# Patient Record
Sex: MALE | Race: WHITE | Employment: OTHER
[De-identification: names, ages, dates, MRNs, and addresses within clinical notes are randomized per-mention and may not be internally consistent; named-entity substitution may affect disease eponyms.]

---

## 2017-03-17 ENCOUNTER — SURGERY (OUTPATIENT)
Age: 77
End: 2017-03-17

## 2017-03-17 ENCOUNTER — ANESTHESIA EVENT (OUTPATIENT)
Dept: ENDOSCOPY | Age: 77
End: 2017-03-17
Payer: MEDICARE

## 2017-03-17 ENCOUNTER — HOSPITAL ENCOUNTER (OUTPATIENT)
Age: 77
Setting detail: OUTPATIENT SURGERY
Discharge: HOME | End: 2017-03-17
Attending: SPECIALIST | Admitting: SPECIALIST
Payer: MEDICARE

## 2017-03-17 ENCOUNTER — ANESTHESIA (OUTPATIENT)
Dept: ENDOSCOPY | Age: 77
End: 2017-03-17
Payer: MEDICARE

## 2017-03-17 VITALS
DIASTOLIC BLOOD PRESSURE: 60 MMHG | BODY MASS INDEX: 27.09 KG/M2 | HEIGHT: 72 IN | OXYGEN SATURATION: 100 % | WEIGHT: 200 LBS | SYSTOLIC BLOOD PRESSURE: 128 MMHG | TEMPERATURE: 97.5 F | RESPIRATION RATE: 16 BRPM | HEART RATE: 73 BPM

## 2017-03-17 LAB
GLUCOSE BLD STRIP.AUTO-MCNC: 143 MG/DL (ref 65–100)
SERVICE CMNT-IMP: ABNORMAL

## 2017-03-17 PROCEDURE — 76040000019: Performed by: SPECIALIST

## 2017-03-17 PROCEDURE — 74011250636 HC RX REV CODE- 250/636: Performed by: PHYSICIAN ASSISTANT

## 2017-03-17 PROCEDURE — 82962 GLUCOSE BLOOD TEST: CPT

## 2017-03-17 PROCEDURE — 76060000031 HC ANESTHESIA FIRST 0.5 HR: Performed by: SPECIALIST

## 2017-03-17 PROCEDURE — 74011250637 HC RX REV CODE- 250/637: Performed by: PHYSICIAN ASSISTANT

## 2017-03-17 PROCEDURE — 74011250636 HC RX REV CODE- 250/636

## 2017-03-17 RX ORDER — PROPOFOL 10 MG/ML
INJECTION, EMULSION INTRAVENOUS AS NEEDED
Status: DISCONTINUED | OUTPATIENT
Start: 2017-03-17 | End: 2017-03-17 | Stop reason: HOSPADM

## 2017-03-17 RX ORDER — PROPOFOL 10 MG/ML
INJECTION, EMULSION INTRAVENOUS
Status: DISCONTINUED | OUTPATIENT
Start: 2017-03-17 | End: 2017-03-17 | Stop reason: HOSPADM

## 2017-03-17 RX ORDER — MIDAZOLAM HYDROCHLORIDE 1 MG/ML
.25-5 INJECTION, SOLUTION INTRAMUSCULAR; INTRAVENOUS
Status: DISCONTINUED | OUTPATIENT
Start: 2017-03-17 | End: 2017-03-17 | Stop reason: HOSPADM

## 2017-03-17 RX ORDER — NALOXONE HYDROCHLORIDE 0.4 MG/ML
0.4 INJECTION, SOLUTION INTRAMUSCULAR; INTRAVENOUS; SUBCUTANEOUS
Status: DISCONTINUED | OUTPATIENT
Start: 2017-03-17 | End: 2017-03-17 | Stop reason: HOSPADM

## 2017-03-17 RX ORDER — ATROPINE SULFATE 0.1 MG/ML
0.5 INJECTION INTRAVENOUS
Status: DISCONTINUED | OUTPATIENT
Start: 2017-03-17 | End: 2017-03-17 | Stop reason: HOSPADM

## 2017-03-17 RX ORDER — EPINEPHRINE 0.1 MG/ML
1 INJECTION INTRACARDIAC; INTRAVENOUS
Status: DISCONTINUED | OUTPATIENT
Start: 2017-03-17 | End: 2017-03-17 | Stop reason: HOSPADM

## 2017-03-17 RX ORDER — SODIUM CHLORIDE 9 MG/ML
50 INJECTION, SOLUTION INTRAVENOUS CONTINUOUS
Status: DISCONTINUED | OUTPATIENT
Start: 2017-03-17 | End: 2017-03-17 | Stop reason: HOSPADM

## 2017-03-17 RX ORDER — FENTANYL CITRATE 50 UG/ML
100 INJECTION, SOLUTION INTRAMUSCULAR; INTRAVENOUS ONCE
Status: DISCONTINUED | OUTPATIENT
Start: 2017-03-17 | End: 2017-03-17 | Stop reason: HOSPADM

## 2017-03-17 RX ORDER — DEXTROMETHORPHAN/PSEUDOEPHED 2.5-7.5/.8
1.2 DROPS ORAL
Status: DISCONTINUED | OUTPATIENT
Start: 2017-03-17 | End: 2017-03-17 | Stop reason: HOSPADM

## 2017-03-17 RX ORDER — FLUMAZENIL 0.1 MG/ML
0.2 INJECTION INTRAVENOUS
Status: DISCONTINUED | OUTPATIENT
Start: 2017-03-17 | End: 2017-03-17 | Stop reason: HOSPADM

## 2017-03-17 RX ADMIN — PROPOFOL 60 MG: 10 INJECTION, EMULSION INTRAVENOUS at 09:48

## 2017-03-17 RX ADMIN — SODIUM CHLORIDE 50 ML/HR: 900 INJECTION, SOLUTION INTRAVENOUS at 09:22

## 2017-03-17 RX ADMIN — SIMETHICONE 80 MG: 20 SUSPENSION/ DROPS ORAL at 10:02

## 2017-03-17 RX ADMIN — PROPOFOL 120 MCG/KG/MIN: 10 INJECTION, EMULSION INTRAVENOUS at 09:48

## 2017-03-17 NOTE — INTERVAL H&P NOTE
Pre-Endoscopy H&P Update  Chief complaint/HPI/ROS:  The indication for the procedure, the patient's history and the patient's current medications are reviewed prior to the procedure and that data is reported on the H&P to which this document is attached. Any significant complaints with regard to organ systems will be noted, and if not mentioned then a review of systems is not contributory. Past Medical History:   Diagnosis Date    Diabetes ([address]     Hypertension     Other ill-defined conditions(799.89)     MRSA on left shoulder from insect bite      Past Surgical History:   Procedure Laterality Date    HX CATARACT REMOVAL Bilateral     HX COLONOSCOPY      HX OTHER SURGICAL  1945    left eye      Social   Social History   Substance Use Topics    Smoking status: Never Smoker    Smokeless tobacco: Never Used    Alcohol use No      Family History   Problem Relation Age of Onset    Emphysema Mother     Cancer Father     Cancer Sister       No Known Allergies   Prior to Admission Medications   Prescriptions Last Dose Informant Patient Reported? Taking? ATORVASTATIN CALCIUM (LIPITOR PO) 3/16/2017 at am  Yes Yes   Sig: Take 1 Tab by mouth daily. OTHER 3/16/2017 at am  Yes Yes   Sig: Take 1 Tab by mouth daily. Indications: vit-b complex   amLODIPine-benazepril (LOTREL) 5-10 mg per capsule 3/16/2017 at am  Yes Yes   Sig: Take 1 Cap by mouth daily. indapamide (LOZOL) 1.25 mg tablet 3/16/2017 at am  Yes Yes   Sig: Take 1.25 mg by mouth daily. sitaGLIPtin-metFORMIN (JANUMET)  mg per tablet 3/16/2017 at am  Yes Yes   Sig: Take 1 Tab by mouth two (2) times daily (with meals). Facility-Administered Medications: None       PHYSICAL EXAM:  The patient is examined immediately prior to the procedure.   Visit Vitals    /66    Pulse 85    Temp 98.5 °F (36.9 °C)    Resp 14    Ht 6' (1.829 m)    Wt 90.7 kg (200 lb)    SpO2 99%    BMI 27.12 kg/m2     Gen: Appears comfortable, no distress. Pulm: comfortable respirations with no abnormal audible breath sounds  CV: heart regular, well perfused  GI: abdomen flat. PLAN:  Informed consent discussion held, patient afforded an opportunity to ask questions and all questions answered. After being advised of the risks, benefits, and alternatives, the patient requested that we proceed and indicated so on a written consent form. Will proceed with procedure as planned.   Nigel Pavon MD

## 2017-03-17 NOTE — PROGRESS NOTES
Isis   1940  [id number]    Situation:  Verbal report received from: Kurt Bertrand, RN  Procedure: Procedure(s):  COLONOSCOPY    Background:    Preoperative diagnosis: PERSONAL HISTORY COLON POLYPS  Postoperative diagnosis: Hemorrhoids    :  Dr. Baltazar Middleton  Assistant(s): Endoscopy RN-1: David Whaley RN    Specimens: * No specimens in log *  H. Pylori  no    Assessment:  Intra-procedure medications       Anesthesia gave intra-procedure sedation and medications, see anesthesia flow sheet yes    Intravenous fluids: NS@ KVO     Vital signs stable   yes    Abdominal assessment: round and soft   yes    Recommendation:  Discharge patient per MD order  yes.   Return to floor  outpatient  Family or Friend   sister  Permission to share finding with family or friend yes

## 2017-03-17 NOTE — PROCEDURES
[facility] MIGUEL Xiong MD  [phone]      2017    Colonoscopy Procedure Note  Shadi Morales  :  1940  Greg Medical Record Number: [id number]    Indications:     Personal history of colon polyps (screening only), Screening colonoscopy  PCP:  Daniel Bunn MD  Anesthesia/Sedation: Conscious Sedation/Moderate Sedation  Endoscopist:  Dr. Tianna Medellin  Complications:  None  Estimated Blood Loss:  None    Permit:  The indications, risks, benefits and alternatives were reviewed with the patient or their decision maker who was provided an opportunity to ask questions and all questions were answered. The specific risks of colonoscopy with conscious sedation were reviewed, including but not limited to anesthetic complication, bleeding, adverse drug reaction, missed lesion, infection, IV site reactions, and intestinal perforation which would lead to the need for surgical repair. Alternatives to colonoscopy including radiographic imaging, observation without testing, or laboratory testing were reviewed including the limitations of those alternatives. After considering the options and having all their questions answered, the patient or their decision maker provided both verbal and written consent to proceed. Procedure in Detail:  After obtaining informed consent, positioning of the patient in the left lateral decubitus position, and conduction of a pre-procedure pause or \"time out\" the endoscope was introduced into the anus and advanced to the cecum, which was identified by the ileocecal valve and appendiceal orifice. The quality of the colonic preparation was good. A careful inspection was made as the colonoscope was withdrawn, findings and interventions are described below. Findings:   No polyps found. Medium sized internal hemorrhoids without bleeding.       Specimens: none    Complications:   None; patient tolerated the procedure well. Impression:  Given age 77 and negative findings today - further colonoscopy for screening is not indicated. Recommendations:     - Follow up with primary care physician. Thank you for entrusting me with this patient's care. Please do not hesitate to contact me with any questions or if I can be of assistance with any of your other patients' GI needs.     Signed By: Conor Hernandez MD                        March 17, 2017

## 2017-03-17 NOTE — ANESTHESIA PREPROCEDURE EVALUATION
Anesthetic History   No history of anesthetic complications            Review of Systems / Medical History  Patient summary reviewed, nursing notes reviewed and pertinent labs reviewed    Pulmonary  Within defined limits                 Neuro/Psych   Within defined limits           Cardiovascular    Hypertension          Hyperlipidemia    Exercise tolerance: >4 METS     GI/Hepatic/Renal  Within defined limits              Endo/Other    Diabetes: type 2         Other Findings              Physical Exam    Airway  Mallampati: II  TM Distance: 4 - 6 cm  Neck ROM: normal range of motion   Mouth opening: Normal     Cardiovascular  Regular rate and rhythm,  S1 and S2 normal,  no murmur, click, rub, or gallop  Rhythm: regular  Rate: normal         Dental  No notable dental hx       Pulmonary  Breath sounds clear to auscultation               Abdominal  GI exam deferred       Other Findings            Anesthetic Plan    ASA: 2  Anesthesia type: MAC          Induction: Intravenous  Anesthetic plan and risks discussed with: Patient

## 2017-03-17 NOTE — DISCHARGE INSTRUCTIONS
[facility] MIGUEL Leo MD  [phone]      March 17, 2017    Ana Kennedy  YOB: 1940    COLONOSCOPY DISCHARGE INSTRUCTIONS    If there is redness at IV site you should apply warm compress to area. If redness or soreness persist contact Dr. Juliocesar Leo' or your primary care doctor. There may be a slight amount of blood passed from the rectum. Gaseous discomfort may develop, but walking, belching will help relieve this. You may not operate a vehicle for 12 hours  You may not operate machinery or dangerous appliances for rest of today  You may not drink alcoholic beverages for 12 hours  Avoid making any critical decisions for 24 hours    DIET:  You may resume your normal diet, but some patients find that heavy or large meals may lead to indigestion or vomiting. I suggest a light meal as first food intake. MEDICATIONS:  The use of some over-the-counter pain medication may lead to bleeding after colon biopsies or polyp removal.  Tylenol (also called acetaminophen) is safe to take even if you have had colonoscopy with polyp removal.  Based on the procedure you had today you may safely take aspirin or aspirin-like products for the next ten (10) days. Remember that Tylenol (also called acetaminophen) is safe to take after colonoscopy even if you have had biopsies or polyps removed. ACTIVITY:  You may resume your normal household activities, but it is recommended that you spend the remainder of the day resting -  avoid any strenuous activity. CALL DR. Johnnie Jennings' OFFICE IF:  Increasing pain, nausea, vomiting  Abdominal distension (swelling)  Significant new or increased bleeding (oral or rectal)  Fever/Chills  Chest pain/shortness of breath                       Additional instructions:   Normal colonoscopy today  No need that you have this done again, barring any new symptoms.      It was an honor to be your doctor today. Please let me or my office staff know if you have any feedback about today's procedure. Perri Mejia MD    Colonoscopy saves lives, and can prevent colon cancer. Everyone aged 48 or older needs colonoscopy.   Tell your family and friends: get the test!

## 2017-03-17 NOTE — IP AVS SNAPSHOT
[address] 
[phone] Patient: Jane Massey MRN: [id number]:[id number] You are allergic to the following No active allergies Recent Documentation Height Weight BMI Smoking Status 1.829 m 90.7 kg 27.12 kg/m2 Never Smoker Unresulted Labs Order Current Status CULTURE, MRSA In process Emergency Contacts Name Discharge Info Relation Home Work Mobile Cuca Klein DISCHARGE CAREGIVER [3] Other Relative [6] [phone] About your hospitalization You were admitted on:  March 17, 2017 You last received care in the:  [facility] ENDOSCOPY You were discharged on:  March 17, 2017 Unit phone number:  [phone] Why you were hospitalized Your primary diagnosis was:  Not on File Providers Seen During Your Hospitalizations Provider Role Specialty Primary office phone Chuck Andrade MD Attending Provider Gastroenterology [phone] Your Primary Care Physician (PCP) Primary Care Physician Office Phone Office Fax Kaylee Diss [phone] Follow-up Information None Current Discharge Medication List  
  
CONTINUE these medications which have NOT CHANGED Dose & Instructions Dispensing Information Comments Morning Noon Evening Bedtime  
 amLODIPine-benazepril 5-10 mg per capsule Commonly known as:  Anibal Duos Your last dose was: Your next dose is:    
   
   
 Dose:  1 Cap Take 1 Cap by mouth daily. Refills:  0  
     
   
   
   
  
 indapamide 1.25 mg tablet Commonly known as:  Milton Borleonardo Your last dose was: Your next dose is:    
   
   
 Dose:  1.25 mg Take 1.25 mg by mouth daily. Refills:  0 JANUMET  mg per tablet Generic drug:  SITagliptin-metFORMIN Your last dose was: Your next dose is:    
   
   
 Dose:  1 Tab Take 1 Tab by mouth two (2) times daily (with meals). Refills:  0 LIPITOR PO Your last dose was: Your next dose is:    
   
   
 Dose:  1 Tab Take 1 Tab by mouth daily. Refills:  0  
     
   
   
   
  
 OTHER Your last dose was: Your next dose is:    
   
   
 Dose:  1 Tab Take 1 Tab by mouth daily. Indications: vit-b complex Refills:  0 Discharge Instructions [address] Katja Toth. Fadi Vásquez, [address] [place] [address] 
[phone] March 17, 2017 Keyana Munguia YOB: 1940 COLONOSCOPY DISCHARGE INSTRUCTIONS If there is redness at IV site you should apply warm compress to area. If redness or soreness persist contact Dr. Fadi Vásquez' or your primary care doctor. There may be a slight amount of blood passed from the rectum. Gaseous discomfort may develop, but walking, belching will help relieve this. You may not operate a vehicle for 12 hours You may not operate machinery or dangerous appliances for rest of today You may not drink alcoholic beverages for 12 hours Avoid making any critical decisions for 24 hours DIET: 
You may resume your normal diet, but some patients find that heavy or large meals may lead to indigestion or vomiting. I suggest a light meal as first food intake. MEDICATIONS: 
The use of some over-the-counter pain medication may lead to bleeding after colon biopsies or polyp removal.  Tylenol (also called acetaminophen) is safe to take even if you have had colonoscopy with polyp removal.  Based on the procedure you had today you may safely take aspirin or aspirin-like products for the next ten (10) days. Remember that Tylenol (also called acetaminophen) is safe to take after colonoscopy even if you have had biopsies or polyps removed.  
 
ACTIVITY: 
 You may resume your normal household activities, but it is recommended that you spend the remainder of the day resting -  avoid any strenuous activity. CALL DR. Elizabeth Amezcua' OFFICE IF: Increasing pain, nausea, vomiting Abdominal distension (swelling) Significant new or increased bleeding (oral or rectal) Fever/Chills Chest pain/shortness of breath Additional instructions:  
Normal colonoscopy today No need that you have this done again, barring any new symptoms. It was an honor to be your doctor today. Please let me or my office staff know if you have any feedback about today's procedure. Lani Batista MD 
 
Colonoscopy saves lives, and can prevent colon cancer. Everyone aged 48 or older needs colonoscopy. Tell your family and friends: get the test! 
 
 
 
 
Discharge Orders None Introducing [facility] SERVICES! [facility] introduces [employer]Search patient portal. Now you can access parts of your medical record, email your doctor's office, and request medication refills online. 1. In your internet browser, go to [email or web address] 2. Click on the First Time User? Click Here link in the Sign In box. You will see the New Member Sign Up page. 3. Enter your In[employer]Search Access Code exactly as it appears below. You will not need to use this code after youve completed the sign-up process. If you do not sign up before the expiration date, you must request a new code. · In[employer]Search Access Code: [email or web address] Expires: 6/15/2017  8:31 AM 
 
4. Enter the last four digits of your Social Security Number (xxxx) and Date of Birth (mm/dd/yyyy) as indicated and click Submit. You will be taken to the next sign-up page. 5. Create a [employer] ID. This will be your InfraSearch login ID and cannot be changed, so think of one that is secure and easy to remember. 6. Create a In[employer]Search password. You can change your password at any time. 7. Enter your Password Reset Question and Answer. This can be used at a later time if you forget your password. 8. Enter your e-mail address. You will receive e-mail notification when new information is available in [address] 9. Click Sign Up. You can now view and download portions of your medical record. 10. Click the Download Summary menu link to download a portable copy of your medical information. If you have questions, please visit the Frequently Asked Questions section of the [employer] website. Remember, [employer] is NOT to be used for urgent needs. For medical emergencies, dial 911. Now available from your iPhone and Android! General Information Please provide this summary of care documentation to your next provider. Patient Signature:  ____________________________________________________________ Date:  ____________________________________________________________  
  
Neris Viera Provider Signature:  ____________________________________________________________ Date:  ____________________________________________________________

## 2017-03-17 NOTE — PERIOP NOTES
Patient received Propofol, per MEHREEN [place] [facility] . Patient transported via stretcher to Endoscopy Recovery area.

## 2017-03-17 NOTE — H&P
77 y.o. male for open access colonoscopy for screening   Additional data for completion of the targeted pre-endoscopy H&P will be provided under 'H&P interval notes'. Please see that document which will be attached to this.   Guy Gamino MD  Colon 2012 nando ta

## 2017-03-18 LAB
BACTERIA SPEC CULT: NORMAL
BACTERIA SPEC CULT: NORMAL
SERVICE CMNT-IMP: NORMAL

## 2020-11-30 ENCOUNTER — OFFICE VISIT (OUTPATIENT)
Dept: NEUROLOGY | Age: 80
End: 2020-11-30
Payer: MEDICARE

## 2020-11-30 VITALS
WEIGHT: 155 LBS | DIASTOLIC BLOOD PRESSURE: 74 MMHG | HEIGHT: 72 IN | HEART RATE: 82 BPM | RESPIRATION RATE: 20 BRPM | OXYGEN SATURATION: 98 % | TEMPERATURE: 97.2 F | SYSTOLIC BLOOD PRESSURE: 128 MMHG | BODY MASS INDEX: 20.99 KG/M2

## 2020-11-30 DIAGNOSIS — R41.3 MEMORY LOSS: Primary | ICD-10-CM

## 2020-11-30 DIAGNOSIS — R47.89 WORD FINDING DIFFICULTY: ICD-10-CM

## 2020-11-30 DIAGNOSIS — R41.0 CONFUSION: ICD-10-CM

## 2020-11-30 PROCEDURE — 1101F PT FALLS ASSESS-DOCD LE1/YR: CPT | Performed by: PSYCHIATRY & NEUROLOGY

## 2020-11-30 PROCEDURE — G8427 DOCREV CUR MEDS BY ELIG CLIN: HCPCS | Performed by: PSYCHIATRY & NEUROLOGY

## 2020-11-30 PROCEDURE — G8420 CALC BMI NORM PARAMETERS: HCPCS | Performed by: PSYCHIATRY & NEUROLOGY

## 2020-11-30 PROCEDURE — 99204 OFFICE O/P NEW MOD 45 MIN: CPT | Performed by: PSYCHIATRY & NEUROLOGY

## 2020-11-30 PROCEDURE — G8536 NO DOC ELDER MAL SCRN: HCPCS | Performed by: PSYCHIATRY & NEUROLOGY

## 2020-11-30 PROCEDURE — G8432 DEP SCR NOT DOC, RNG: HCPCS | Performed by: PSYCHIATRY & NEUROLOGY

## 2020-11-30 RX ORDER — BENAZEPRIL HYDROCHLORIDE 10 MG/1
TABLET ORAL
COMMUNITY
Start: 2020-10-31

## 2020-11-30 NOTE — PROGRESS NOTES
Memory loss- has been several months that they noticed, hard time completing a sentence, can't find the words that he wants to say    Atleast 2 months ago they talked amongst themselves and noticed he wasn't connecting the dots     Stumbles a lot, balance is off  He states that his vision seems off, he seems like he is losing focus on things

## 2020-11-30 NOTE — PROGRESS NOTES
[facility] and [address] at [facility] Neurology Clinics at [facility] [address], [address] [facility] [address] [facility], [address] [place] [address]  [phone] Office  [phone] Facsimile           Referring: Juliane Jon MD      Chief Complaint   Patient presents with    New Patient     memory loss    67-year-old right-handed gentleman who presents today accompanied by his sister for initial neurologic consultation regarding with a call memory lapses. He tells me that he has had for the last couple of months difficulty getting the right word out. He says he will be in the middle of a sentence and cannot say what he wants to say. This seems to be progressive. It was not abrupt. He had no changes about 2 months ago and that he had no illness or injury. There was no change in his medicine. No inciting factor. He never had this before. His sister says that he lives alone and he concurs. The other sister, Davi Pedersen, lives down the road and generally checks in on him but she has been diagnosed with multiple myeloma and with Covid she is unable to be close to him. But both sisters have noticed that he has had difficulty with his ability to communicate via telephone. They will call him and he will not answer the phone because he cannot remember how to do so. He cannot call them because he cannot remember how to use the phone. They have written down instructions and he cannot follow the instructions. He has had difficulty with paying his bills. He has had difficulty remembering his medications. No dangerous behaviors. Seems imbalance at times. Grandmother had dementia. He has difficulty using his computer and he actually took his computer apart thinking it did not work but then his nephew came over and the computer was fine. He has had blood work that has been unremarkable.   He has not had any cranial imaging. He is a retired . Office documentation kindly provided by Dr. Joselin campa from November 16, 2020 where she was following up for thyroid as well as weight loss. He was supposed to see GI but did not keep that appointment. His sister was with him and brought up concerns about memory and had difficulty using the cell phone to call her. He would forget to eat and forget to turn off the television. He was unable to keep up with his affairs. Laboratory analyses with largely unremarkable metabolic profile although he was slightly hypoglycemic and total protein was low. TSH was low but T4 and other indices were okay. B12 and folate were normal at 458 and greater than 20 respectively. Hemoglobin slightly low at 11.4. Past Medical History:   Diagnosis Date    Anxiety disorder     Constipation     Dementia ([facility] [address]     Diabetes ([facility] [address] [facility]     Hearing reduced     Hypertension     Other ill-defined conditions(799.89)     MRSA on left shoulder from insect bite    Vision decreased        Past Surgical History:   Procedure Laterality Date    COLONOSCOPY N/A 3/17/2017    COLONOSCOPY performed by Ann Henry MD at [facility] HX CATARACT REMOVAL Bilateral     HX COLONOSCOPY      HX OTHER SURGICAL  1945    left eye        Current Outpatient Medications   Medication Sig Dispense Refill    benazepriL (LOTENSIN) 10 mg tablet       atorvastatin (Lipitor) 10 mg tablet Take 1 Tab by mouth daily.  sitaGLIPtin-metFORMIN (JANUMET)  mg per tablet Take 1 Tab by mouth daily.  OTHER Take 1 Tab by mouth daily. Indications: vit-b complex      indapamide (LOZOL) 1.25 mg tablet Take 1.25 mg by mouth daily.  amLODIPine-benazepril (LOTREL) 5-10 mg per capsule Take 1 Cap by mouth daily.             No Known Allergies    Social History     Tobacco Use    Smoking status: Never Smoker    Smokeless tobacco: Never Used   Substance Use Topics    Alcohol use: No    Drug use: No       Family History   Problem Relation Age of Onset    Emphysema Mother     Heart Disease Mother     Cancer Father     Cancer Sister     Hypertension Sister        Review of Systems  Pertinent positives and negatives as noted with remainder of comprehensive review negative      Examination  Visit Vitals  /74   Pulse 82   Temp 97.2 °F (36.2 °C)   Resp 20   Ht 6' (1.829 m)   Wt 70.3 kg (155 lb)   SpO2 98%   BMI 21.02 kg/m²     Pleasant, well appearing. Dress and grooming are appropriate. No scleral icterus is present. Oropharynx is clear. Supple neck without bruit appreciated. Heart regular. Pulses are symmetrical.  No edema in the lower extremities. Neurologically he is awake alert and conversant. He is oriented to the borderline between November and December and thinks is December. He says it is 2020. He is unsure of the exact date. He says that the current president is [employer]calc and Rashmi Ruperto says he won the election and Hollie Fitzpatrick says he won the election. Registration 3/3 recall 0/3 and with clues 0/3. He correctly calculates a number of quarters in $1.75. He follows commands. He does have difficulty with multistep commands. Cranial nerves intact 2-12. No nystagmus. No pronation and no drift. No abnormal movement. He resists fully in the upper and lower extremities in all muscle groups to direct conversational testing. Reflexes globally depressed but symmetrical.  No ataxia. Gait steady. Sensory intact.     Impression/Plan  52-year-old gentleman with cognitive loss that he and his sister have really noticed over the last 2 months or so and this accompanied by the word finding difficulty question whether he may have had a small stroke versus just the declouding of a dementia with the current situation with the pandemic and not having as much support as he typically would have etc.  To that end we will get an MRI of the brain as well as a carotid Doppler EEG and formal neuropsychological evaluation. He will follow at the conclusion of his testing. Steve Escobar MD    This note was created using voice recognition software. Despite editing, there may be syntax errors.

## 2020-12-10 ENCOUNTER — OFFICE VISIT (OUTPATIENT)
Dept: NEUROLOGY | Age: 80
End: 2020-12-10

## 2020-12-10 ENCOUNTER — TELEPHONE (OUTPATIENT)
Dept: NEUROLOGY | Age: 80
End: 2020-12-10

## 2020-12-10 ENCOUNTER — APPOINTMENT (OUTPATIENT)
Dept: VASCULAR SURGERY | Age: 80
End: 2020-12-10
Attending: PSYCHIATRY & NEUROLOGY
Payer: MEDICARE

## 2020-12-10 ENCOUNTER — HOSPITAL ENCOUNTER (OUTPATIENT)
Dept: MRI IMAGING | Age: 80
Discharge: HOME | End: 2020-12-10
Attending: PSYCHIATRY & NEUROLOGY
Payer: MEDICARE

## 2020-12-10 VITALS — BODY MASS INDEX: 32.55 KG/M2 | WEIGHT: 240 LBS

## 2020-12-10 DIAGNOSIS — R41.3 MEMORY LOSS: ICD-10-CM

## 2020-12-10 DIAGNOSIS — R47.89 WORD FINDING DIFFICULTY: ICD-10-CM

## 2020-12-10 DIAGNOSIS — R41.0 CONFUSION: ICD-10-CM

## 2020-12-10 DIAGNOSIS — R41.0 CONFUSION: Primary | ICD-10-CM

## 2020-12-10 PROCEDURE — 74011250636 HC RX REV CODE- 250/636: Performed by: RADIOLOGY

## 2020-12-10 PROCEDURE — A9575 INJ GADOTERATE MEGLUMI 0.1ML: HCPCS | Performed by: RADIOLOGY

## 2020-12-10 PROCEDURE — 70553 MRI BRAIN STEM W/O & W/DYE: CPT

## 2020-12-10 RX ORDER — GADOTERATE MEGLUMINE 376.9 MG/ML
14 INJECTION INTRAVENOUS
Status: COMPLETED | OUTPATIENT
Start: 2020-12-10 | End: 2020-12-10

## 2020-12-10 RX ORDER — GADOTERATE MEGLUMINE 376.9 MG/ML
20 INJECTION INTRAVENOUS
Status: DISCONTINUED | OUTPATIENT
Start: 2020-12-10 | End: 2020-12-10

## 2020-12-10 RX ADMIN — GADOTERATE MEGLUMINE 14 ML: 376.9 INJECTION INTRAVENOUS at 12:35

## 2020-12-10 NOTE — TELEPHONE ENCOUNTER
Sister (on [address]) is requesting a call in ref to getting a letter drawn up to say that her brother is of sound mind to work on his will with   Phong # [phone]

## 2020-12-21 NOTE — TELEPHONE ENCOUNTER
Returned call, notified that neurocog testing will need to be done prior to making this determination. She agrees.

## 2020-12-29 NOTE — PROCEDURES
EEG:      Date:  12/10/20    Requesting Physician:  Martha Shah. MD Haseeb    An EEG is requested in this 79year old man with confusion to evaluate for epileptiform abnormality. Medications:  Medications are listed as Lozol, Janumet, Lipitor, Lotensin. This tracing is obtained during the awake state. During wakefulness there is no posteriorly dominant alpha rhythm. Instead, the background consists of diffuse mixed theta and alpha range frequencies. Hyperventilation is not performed secondary to COVID-19 precautions. Intermittent photic stimulation little alters the tracing. Sleep is not attained. Interpretation: This EEG recorded during the awake state is abnormal secondary to diffuse slowing and disorganization of the background rhythms, indicative of a mild to moderate degree of bihemispheric dysfunction, as is commonly seen with an encephalopathy, which may have contributions from toxic, metabolic, diffuse structural and/or pharmacologic effects, and clinical correlation is recommended. This pattern is also commonly seen in chronic neurodegenerative disorders such as dementia.

## 2021-02-02 ENCOUNTER — OFFICE VISIT (OUTPATIENT)
Dept: NEUROLOGY | Age: 81
End: 2021-02-02
Payer: MEDICARE

## 2021-02-02 VITALS — TEMPERATURE: 97.3 F

## 2021-02-02 DIAGNOSIS — G31.84 MILD COGNITIVE IMPAIRMENT: Primary | ICD-10-CM

## 2021-02-02 DIAGNOSIS — R47.89 WORD FINDING DIFFICULTY: ICD-10-CM

## 2021-02-02 DIAGNOSIS — R41.89 COGNITIVE DECLINE: ICD-10-CM

## 2021-02-02 DIAGNOSIS — F41.1 GENERALIZED ANXIETY DISORDER: ICD-10-CM

## 2021-02-02 DIAGNOSIS — R41.3 SHORT-TERM MEMORY LOSS: ICD-10-CM

## 2021-02-02 PROCEDURE — 90791 PSYCH DIAGNOSTIC EVALUATION: CPT | Performed by: CLINICAL NEUROPSYCHOLOGIST

## 2021-02-02 NOTE — PROGRESS NOTES
[address] [facility],5Th [address]  [address]   [address] [place] [address] [place] [address] [address]   [phone] Office   [phone] Fax      Neuropsychology    Initial Diagnostic Interview Note      Referral:  Kush Long MD, . Cherise Dutton is a 79 y.o. right handed single  male who was accompanied by his sister to the initial clinical interview on 2/221. Please refer to his medical records for details pertaining to his history. At the start of the appointment, I reviewed the patient's [facility] Epic Chart (including Media scanned in from previous providers) for the active Problem List, all pertinent Past Medical Hx, medications, recent radiologic and laboratory findings. In addition, I reviewed pt's documented Immunization Record and Encounter History. 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 He lives alone. Memory has been getting worse for the past year or more, he says. He says he forgets the content of conversations. Misplaces things. He has a hard time coming up with words and names as well. No known stroke, meningitis/encephalitis, THERON Fever, Lupus, Lyme, TBI, sz, etc.  Sister says he has memory problems. She lives in [facility] and he lives in [facility] and they have another sister who lives closer and family have noted progressive changes in memory. He has another sister who has multiple myeloma and with covid she doesn't get very close to him. He has a harder time communicating and using the phone. He got really bad for awhile. He could not remember how to use the phone. They have written things down- instructions - about how to do use the phone and he has a hard time doing that. HE has a hard time paying his bills and remembering his medications. He has some difficulties with balance. No dangerous behaviors. Seems imbalance at times. Grandmother had dementia. He has difficulty using his computer and he actually took his computer apart thinking it did not work but then his nephew came over and the computer was fine. He has had blood work that has been unremarkable. Sister calls him daily. He drove until September or so of this year. He was finding himself more and more unsure of himself, and so family took the keys from him. They say he has no business in driving. He does wake up in the night from time to time. He gets up at night, and worries about his cats, and goes to check on them and checks to make sure the house is locked up and such. No depression. No counseling or psychiatrist.  Justo Mcgill eating properly. Wouldn't think to eat. Has lost a lot of weight, 50 some pounds. 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 EXAM:  MRI BRAIN W WO CONT     INDICATION:    confusion, visual disturbance     COMPARISON:  None.     CONTRAST: 14 ml Dotarem.     TECHNIQUE:    Multiplanar multisequence acquisition without and with contrast of the brain.     FINDINGS:  Mild generalized parenchymal volume loss with commensurate dilation of the sulci  and ventricular system. Periventricular and deep white matter T2/FLAIR  hyperintensities, confluent in regions, consistent with moderate chronic  microvascular ischemic disease. There is no acute infarct, hemorrhage,  extra-axial fluid collection, or mass effect. There is no cerebellar tonsillar  herniation. Expected arterial flow-voids are present. No evidence of abnormal  enhancement.     Scattered mucosal thickening in the bilateral middle air cells and maxillary  sinuses without air-fluid level. The mastoid air cells and middle ears are  clear. The orbital contents are within normal limits with bilateral lens  implants. No significant osseous or scalp lesions are identified.     IMPRESSION  IMPRESSION:      1. No acute intracranial abnormality.   2. Mild generalized parenchymal volume loss and moderate chronic microvascular  ischemic disease.           Neuropsychological Mental Status Exam (NMSE):      Historian: Good  Praxis: No UE apraxia  R/L Orientation: Intact to self and to other  Dress: within normal limits   Weight: within normal limits   Appearance/Hygiene: within normal limits   Gait: within normal limits   Assistive Devices: Glasses  Mood: within normal limits   Affect: within normal limits   Comprehension: within normal limits   Thought Process: within normal limits   Expressive Language: within normal limits   Receptive Language: within normal limits   Motor:  No cognitive or motor perseveration  ETOH: Denied  Tobacco: Denied  Illicit: Denied  SI/HI: Denied  Psychosis: Denied  Insight: Within normal limits  Judgment: Within normal limits  Other Psych:      Past Medical History:   Diagnosis Date    Anxiety disorder     Constipation     Dementia (HCC)     Diabetes ([facility] [address]     Hearing reduced     Hypertension     Other ill-defined conditions(799.89)     MRSA on left shoulder from insect bite    Vision decreased        Past Surgical History:   Procedure Laterality Date    COLONOSCOPY N/A 3/17/2017    COLONOSCOPY performed by Raj Hannah MD at [address] [facility] HX CATARACT REMOVAL Bilateral     HX COLONOSCOPY      HX OTHER SURGICAL  1945    left eye        No Known Allergies    Family History   Problem Relation Age of Onset    Emphysema Mother     Heart Disease Mother     Cancer Father     Cancer Sister     Hypertension Sister        Social History     Tobacco Use    Smoking status: Never Smoker    Smokeless tobacco: Never Used   Substance Use Topics    Alcohol use: No    Drug use: No       Current Outpatient Medications   Medication Sig Dispense Refill    benazepriL (LOTENSIN) 10 mg tablet       atorvastatin (Lipitor) 10 mg tablet Take 1 Tab by mouth daily.       indapamide (LOZOL) 1.25 mg tablet Take 1.25 mg by mouth daily.        sitaGLIPtin-metFORMIN (JANUMET)  mg per tablet Take 1 Tab by mouth daily.  amLODIPine-benazepril (LOTREL) 5-10 mg per capsule Take 1 Cap by mouth daily.  OTHER Take 1 Tab by mouth daily. Indications: vit-b complex           Plan:  Obtain authorization for testing from insurance company. Report to follow once testing, scoring, and interpretation completed. ? Organic based neurocognitive issues versus mood disorder or combination of same. ? Problems organic, functional, or both? This note will not be viewable in [address]

## 2021-04-27 ENCOUNTER — OFFICE VISIT (OUTPATIENT)
Dept: NEUROLOGY | Age: 81
End: 2021-04-27
Payer: MEDICARE

## 2021-04-27 DIAGNOSIS — R41.89 COGNITIVE DECLINE: ICD-10-CM

## 2021-04-27 DIAGNOSIS — F02.80 LATE ONSET ALZHEIMER'S DEMENTIA WITHOUT BEHAVIORAL DISTURBANCE (HCC): Primary | ICD-10-CM

## 2021-04-27 DIAGNOSIS — G30.1 LATE ONSET ALZHEIMER'S DEMENTIA WITHOUT BEHAVIORAL DISTURBANCE (HCC): Primary | ICD-10-CM

## 2021-04-27 DIAGNOSIS — Z86.59 HISTORY OF ANXIETY: ICD-10-CM

## 2021-04-27 PROCEDURE — 96132 NRPSYC TST EVAL PHYS/QHP 1ST: CPT | Performed by: CLINICAL NEUROPSYCHOLOGIST

## 2021-04-27 PROCEDURE — 96138 PSYCL/NRPSYC TECH 1ST: CPT | Performed by: CLINICAL NEUROPSYCHOLOGIST

## 2021-04-27 PROCEDURE — 96136 PSYCL/NRPSYC TST PHY/QHP 1ST: CPT | Performed by: CLINICAL NEUROPSYCHOLOGIST

## 2021-04-27 PROCEDURE — 96133 NRPSYC TST EVAL PHYS/QHP EA: CPT | Performed by: CLINICAL NEUROPSYCHOLOGIST

## 2021-04-27 PROCEDURE — 96137 PSYCL/NRPSYC TST PHY/QHP EA: CPT | Performed by: CLINICAL NEUROPSYCHOLOGIST

## 2021-04-27 PROCEDURE — 96139 PSYCL/NRPSYC TST TECH EA: CPT | Performed by: CLINICAL NEUROPSYCHOLOGIST

## 2021-05-06 NOTE — PROGRESS NOTES
[address] [facility],5Th Floor  [address]   [address] [place] [address] [facility] [address]    [phone] Office   [phone] Fax      Neuropsychological Evaluation Report      Referral:  Graham Mosley MD, Tenisha Shultz is a 79 y.o. right handed single  male who was accompanied by his sister to the initial clinical interview on 2/221. Please refer to his medical records for details pertaining to his history. At the start of the appointment, I reviewed the patient's [facility] Epic Chart (including Media scanned in from previous providers) for the active Problem List, all pertinent Past Medical Hx, medications, recent radiologic and laboratory findings. In addition, I reviewed pt's documented Immunization Record and Encounter History. 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 He lives alone. Memory has been getting worse for the past year or more, he says. He says he forgets the content of conversations. Misplaces things. He has a hard time coming up with words and names as well. No known stroke, meningitis/encephalitis, THERON Fever, Lupus, Lyme, TBI, sz, etc.  Sister says he has memory problems. She lives in [place] and he lives in [facility] and they have another sister who lives closer and family have noted progressive changes in memory. He has another sister who has multiple myeloma and with covid she doesn't get very close to him. He has a harder time communicating and using the phone. He got really bad for awhile. He could not remember how to use the phone. They have written things down- instructions - about how to do use the phone and he has a hard time doing that. HE has a hard time paying his bills and remembering his medications. He has some difficulties with balance.   No dangerous behaviors. Seems imbalance at times. Grandmother had dementia. He has difficulty using his computer and he actually took his computer apart thinking it did not work but then his nephew came over and the computer was fine. He has had blood work that has been unremarkable. Sister calls him daily. He drove until September or so of this year. He was finding himself more and more unsure of himself, and so family took the keys from him. They say he has no business in driving. He does wake up in the night from time to time. He gets up at night, and worries about his cats, and goes to check on them and checks to make sure the house is locked up and such. No depression. No counseling or psychiatrist.  Mirta Copier eating properly. Wouldn't think to eat. Has lost a lot of weight, 50 some pounds. 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 EXAM:  MRI BRAIN W WO CONT     INDICATION:    confusion, visual disturbance     COMPARISON:  None.     CONTRAST: 14 ml Dotarem.     TECHNIQUE:    Multiplanar multisequence acquisition without and with contrast of the brain.     FINDINGS:  Mild generalized parenchymal volume loss with commensurate dilation of the sulci  and ventricular system. Periventricular and deep white matter T2/FLAIR  hyperintensities, confluent in regions, consistent with moderate chronic  microvascular ischemic disease. There is no acute infarct, hemorrhage,  extra-axial fluid collection, or mass effect. There is no cerebellar tonsillar  herniation. Expected arterial flow-voids are present. No evidence of abnormal  enhancement.     Scattered mucosal thickening in the bilateral middle air cells and maxillary  sinuses without air-fluid level. The mastoid air cells and middle ears are  clear. The orbital contents are within normal limits with bilateral lens  implants. No significant osseous or scalp lesions are identified.     IMPRESSION  IMPRESSION:      1. No acute intracranial abnormality.   2. Mild generalized parenchymal volume loss and moderate chronic microvascular  ischemic disease.           Neuropsychological Mental Status Exam (NMSE):      Historian: Good  Praxis: No UE apraxia  R/L Orientation: Intact to self and to other  Dress: within normal limits   Weight: within normal limits   Appearance/Hygiene: within normal limits   Gait: within normal limits   Assistive Devices: Glasses  Mood: within normal limits   Affect: within normal limits   Comprehension: within normal limits   Thought Process: within normal limits   Expressive Language: within normal limits   Receptive Language: within normal limits   Motor:  No cognitive or motor perseveration  ETOH: Denied  Tobacco: Denied  Illicit: Denied  SI/HI: Denied  Psychosis: Denied  Insight: Within normal limits  Judgment: Within normal limits  Other Psych:      Past Medical History:   Diagnosis Date    Anxiety disorder     Constipation     Dementia (HCC)     Diabetes ([facility] [address]     Hearing reduced     Hypertension     Other ill-defined conditions(799.89)     MRSA on left shoulder from insect bite    Vision decreased        Past Surgical History:   Procedure Laterality Date    COLONOSCOPY N/A 3/17/2017    COL[facility]COPY performed by Deepali Padilla MD at [facility] [facility] HX CATARACT REMOVAL Bilateral     HX COLONOSCOPY      HX OTHER SURGICAL  1945    left eye        No Known Allergies    Family History   Problem Relation Age of Onset    Emphysema Mother     Heart Disease Mother     Cancer Father     Cancer Sister     Hypertension Sister        Social History     Tobacco Use    Smoking status: Never Smoker    Smokeless tobacco: Never Used   Substance Use Topics    Alcohol use: No    Drug use: No       Current Outpatient Medications   Medication Sig Dispense Refill    benazepriL (LOTENSIN) 10 mg tablet       atorvastatin (Lipitor) 10 mg tablet Take 1 Tab by mouth daily.  indapamide (LOZOL) 1.25 mg tablet Take 1.25 mg by mouth daily.  sitaGLIPtin-metFORMIN (JANUMET)  mg per tablet Take 1 Tab by mouth daily.  amLODIPine-benazepril (LOTREL) 5-10 mg per capsule Take 1 Cap by mouth daily.  OTHER Take 1 Tab by mouth daily. Indications: vit-b complex           Plan:  Obtain authorization for testing from insurance company. Report to follow once testing, scoring, and interpretation completed. ? Organic based neurocognitive issues versus mood disorder or combination of same. ? Problems organic, functional, or both? This note will not be viewable in [address] Neuropsychological Test Results  Patient Testing 4/27/21 Report Completed 5/6/21  A Psychometrist Assisted w/ portions of this evaluation while under my direct  supervision    The following evaluation procedures/tests were administered:      Neuropsychologist Administered/Interpreted:  Neuropsychological Mental Status Exam, Revised Memory & Behavior Checklist,  Mini Mental Status Exam, Clock Drawing Test, Test Of Premorbid Functioning, Siomara-Melzack Pain Questionnaire, History Taking  & Clinical Interview With The Patient, Additional History Taking w/ The Patient's Sister, JUAN, CPT-III, Review Of Available Records. Psychometrist Administered under Neuropsychologist Supervision & Neuropsychologist Interpreted:  Verbal Fluency Tests, Saul & Saul  Revised, Trailmaking Test Parts A & B, Wechsler Adult Intelligence Scale - IV, [place] [employer]  3, Grooved Pegboard, Brito Depression Inventory  II, Brito Anxiety Inventory. Test Findings:  Test Findings:  Note:  The patients raw data have been compared with currently available norms which include demographic corrections for age, gender, and/or education. Sometimes, the patients scores are compared to demographically similar individuals as close to the patients age, education level, etc., as possible.   \"Average\" is viewed as being +/- 1 standard deviation (SD) from the stated mean for a particular test score. \"Low average\" is viewed as being between 1 and 2 SD below the mean, and above average is viewed as being 1 and 2 SD above the mean. Scores falling in the borderline range (between 1-1/2 and 2 SD below the mean) are viewed with particular attention as to whether they are normal or abnormal neurocognitive test scores. Other methods of inference in analyzing the test data are also utilized, including the pattern and range of scores in the profile, bilateral motor functions, and the presence, if any, of pathognomonic signs. Behaviorally, the patient was friendly and cooperative and appeared motivated to perform well during this examination. Within this context, the results of this evaluation are viewed as a valid reflection of the patients actual neurocognitive and emotional status. His MMSE score of 25/30 correct was impaired. In this regard, he was not oriented to year or date. Recall for 3 words after brief delay was 1/3 correct. Visual construction was impaired. Clock drawing was significantly impaired. His structured word list fluency, as assessed by the FAS Test, was within the below average range with a T score of 40. Category fluency was within the moderately to severely impaired range with a T score of 20. Confrontation naming ability, as assessed by the [facility]  Revised, was within the mildly to moderately impaired range at 43/60 correct (T = 34). This pattern of performance is indicative of a patient who is at increased risk for day-to-day problems with verbal fluency and confrontation naming was normal.       The patient was administered the [facility] Continuous Performance Test  III,a computer administered test of sustained attention, and review of the subscales within this instrument revealed severe concerns for inattentiveness without impulsivity.   This pattern of performance is indicative of a patient who is at increased risk for day-to-day problems with sustained visual attention/concentration. The patient is showing problems with perceptual reasoning (5th percentile) on the WAIS-IV. His Verbal Comprehension Index score of 125 (95th percentile) was within the superior range. His working memory index score of 100 (50th percentile) was average. Perceptional reasoning is lower than what would be expected based on his performance on a task estimating premorbid functioning levels. The patient was administered the [place] Verbal Learning Test  - 3 and generated an impaired range (and flat) learning curve over five repeated auditory word list learning trials. An interference trial was within the normal range. Free and cued, short and long delayed recall were all impaired. Recognition recall was impaired, as was his forced choice recall. No concern for malingering, however. This pattern of performance is indicative of a patient who is at increased risk for day-to-day problems with auditory learning and memory. Simple timed visual motor sequencing (Trailmaking Test Part A) was within the severely impaired range with a T score of 18. His performance on a similar, but more complex task of timed visual motor sequencing (Trailmaking Test Part B) was within the severely impaired range with a T score of 17. This latter test was discontinued. Taken together, this pattern of performance is indicative of a patient who is at increased risk for day-to-day problems with executive functioning. Fine motor dexterity was within the moderately to severely impaired  range bilaterally. This pattern of performance is  indicative of a patient who is at increased risk for day-to-day problems with bilateral motor dexterity. The patient rated his current level of pain as \"0/5- No Pain\" on the Siomara-Melzack Pain Questionnaire. His Brito Depression Inventory- II score of 9 was within the normal range.   His Brito Anxiety Inventory score of 4 reflected minimal anxiety. The patient was administered the Personality Assessment Inventory and generated an invalid profile for further interpretation. Impressions & Recommendations: This patient generated an abnormal range Neuropsychological Evaluation with respect to neurocognitive functioning. In this regard, he is showing problems with mental status, verbal fluency, confrontation naming, sustained attention, perceptual reasoning, auditory learning, auditory memory, bilateral fine motor dexterity, and executive functioning. Casual language skills will serve to mask underlying cognitive deficits at times. IQ remains within the superior range of functioning. Emotionally, the patient denied clinically significant psychopathology. There is a history of anxiety. The profile is not consistent with pseudodementia. In my opinion, this profile is consistent with an evolving organic process that is currently at least moderate, if not moderate to severe. This is likely Alzheimer's disease. A mixed Alzheimer's and vascular issue has not been ruled out, though. I suggest a review of his current medication management for memory as well as consideration for an appropriate medication for his severe attention deficit issues. His emotional status does need to be monitored over time. The patient should be encouraged to remain as mentally, socially, and physically active as possible. The patient's generated cognitive difficulties are significant to the degree whereby it is my opinion that he should not live independently without appropriate supervision for those domains with a heavy memory emphasis. This includes nutritional/meal preparation supervision, day-to-day household safety supervision, medication management supervision and supervision of financial dealings. He is not to drive.   I find him lacking capacity to make informed medical decisions, financial decisions, to vote, to , and to own a firearm. A POA/guardian should be assigned if this is not been done so already. Consider in-home health services to assist.  He may require transfer to assisted living in the future. We now have extensive baseline neurocognitive data on him. Prognosis is unfortunately guarded, at best.   Follow up prn. Clinical correlation is, of course, indicated. I will discuss these findings with the patient and family when they follow up with me in the near future. A follow up Neuropsychological Evaluation is indicated on a prn basis. 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 If there is any additional information of which I am currently unaware, I would be more than happy to review it upon having it made available to me. Thank you for the opportunity to see this interesting individual.     Sincerely,       Horacio Justin. Cynthia Colon PsyD, EdS      Attachments:  IQ Test Results (In Media Section Of This EMR)    Cc: Margarita Cummings MD    Time Documentation:    02814*6 36276*8 (60 minutes)    67389 x 1  96139 x 5 Test Administration/Data Gathering By Technician: (3 hours). 38626 x 1 (first 30 minutes), 34807 x 5 (each additional 30 minutes)    96132 x 1  96133 x 1 Testing Evaluation Services by Neuropsychologist (1 hour, 50 minutes) 96132 x 1 (first hour), 96133 x 1 (50 minutes)    Definitions:      01901/03856:  Neurobehavioral Status Exam, Clinical interview. Clinical assessment of thinking, reasoning and judgment, by neuropsychologist, both face to face time with patient and time interpreting those test results and reporting, first and subsequent hours)    42054/76278: Neuropsychological Test Administration by Technician/Psychometrist, first 30 minutes and each additional 30 minutes. The above includes: Record review.   Review of history provided by patient. Review of collaborative information. Testing by Clinician. Review of raw data. Scoring. Report writing of individual tests administered by Clinician. Integration of individual tests administered by psychometrist with NSE/testing by clinician, review of records/history/collaborative information, case Conceptualization, treatment planning, clinical decision making, report writing, coordination Of Care. Psychometry test codes as time spent by psychometrist administering and scoring neurocognitive/psychological tests under supervision of neuropsychologist.  Integral services including scoring of raw data, data interpretation, case conceptualization, report writing etcetera were initiated after the patient finished testing/raw data collected and was completed on the date the report was signed. Please note that this dictation was completed with [email or web address], the [employer] voice recognition software. Quite often unanticipated grammatical, syntax, homophones, and other interpretive errors are inadvertently transcribed by the computer software. Please disregard these errors. Please excuse any errors that have escaped final proofreading.   Thank you.,

## 2021-06-04 ENCOUNTER — OFFICE VISIT (OUTPATIENT)
Dept: NEUROLOGY | Age: 81
End: 2021-06-04
Payer: MEDICARE

## 2021-06-04 DIAGNOSIS — Z86.59 HISTORY OF ANXIETY: ICD-10-CM

## 2021-06-04 DIAGNOSIS — F02.80 LATE ONSET ALZHEIMER'S DEMENTIA WITHOUT BEHAVIORAL DISTURBANCE (HCC): Primary | ICD-10-CM

## 2021-06-04 DIAGNOSIS — G30.1 LATE ONSET ALZHEIMER'S DEMENTIA WITHOUT BEHAVIORAL DISTURBANCE (HCC): Primary | ICD-10-CM

## 2021-06-04 DIAGNOSIS — R41.89 COGNITIVE DECLINE: ICD-10-CM

## 2021-06-04 PROCEDURE — 90832 PSYTX W PT 30 MINUTES: CPT | Performed by: CLINICAL NEUROPSYCHOLOGIST
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
## 2021-06-16 ENCOUNTER — OFFICE VISIT (OUTPATIENT)
Dept: NEUROLOGY | Age: 81
End: 2021-06-16
Payer: MEDICARE

## 2021-06-16 VITALS
OXYGEN SATURATION: 100 % | HEART RATE: 70 BPM | WEIGHT: 161 LBS | HEIGHT: 72 IN | RESPIRATION RATE: 16 BRPM | SYSTOLIC BLOOD PRESSURE: 112 MMHG | BODY MASS INDEX: 21.81 KG/M2 | DIASTOLIC BLOOD PRESSURE: 64 MMHG

## 2021-06-16 DIAGNOSIS — F02.80 MIXED ALZHEIMER'S AND VASCULAR DEMENTIA (HCC): Primary | ICD-10-CM

## 2021-06-16 DIAGNOSIS — F01.50 MIXED ALZHEIMER'S AND VASCULAR DEMENTIA (HCC): Primary | ICD-10-CM

## 2021-06-16 DIAGNOSIS — G30.9 MIXED ALZHEIMER'S AND VASCULAR DEMENTIA (HCC): Primary | ICD-10-CM

## 2021-06-16 PROCEDURE — 1101F PT FALLS ASSESS-DOCD LE1/YR: CPT | Performed by: NURSE PRACTITIONER

## 2021-06-16 PROCEDURE — G8432 DEP SCR NOT DOC, RNG: HCPCS | Performed by: NURSE PRACTITIONER

## 2021-06-16 PROCEDURE — G8536 NO DOC ELDER MAL SCRN: HCPCS | Performed by: NURSE PRACTITIONER

## 2021-06-16 PROCEDURE — G8420 CALC BMI NORM PARAMETERS: HCPCS | Performed by: NURSE PRACTITIONER

## 2021-06-16 PROCEDURE — 99214 OFFICE O/P EST MOD 30 MIN: CPT | Performed by: NURSE PRACTITIONER

## 2021-06-16 PROCEDURE — G8427 DOCREV CUR MEDS BY ELIG CLIN: HCPCS | Performed by: NURSE PRACTITIONER

## 2021-06-16 RX ORDER — ACETAMINOPHEN 325 MG/1
TABLET ORAL
COMMUNITY

## 2021-06-16 RX ORDER — OMEPRAZOLE/SODIUM BICARBONATE 40; 1680 MG/1; MG/1
POWDER, FOR SUSPENSION ORAL
COMMUNITY

## 2021-06-16 RX ORDER — DONEPEZIL HYDROCHLORIDE 5 MG/1
TABLET, FILM COATED ORAL
Qty: 30 TABLET | Refills: 0 | Status: SHIPPED | OUTPATIENT
Start: 2021-06-16 | End: 2021-08-17 | Stop reason: DRUGHIGH

## 2021-06-16 RX ORDER — LOPERAMIDE HYDROCHLORIDE 2 MG/1
CAPSULE ORAL
COMMUNITY

## 2021-06-16 RX ORDER — DONEPEZIL HYDROCHLORIDE 10 MG/1
10 TABLET, FILM COATED ORAL
Qty: 30 TABLET | Refills: 0 | Status: SHIPPED | OUTPATIENT
Start: 2021-06-16

## 2021-06-16 RX ORDER — ADHESIVE BANDAGE
30 BANDAGE TOPICAL DAILY PRN
COMMUNITY

## 2021-06-16 RX ORDER — ASPIRIN 81 MG/1
TABLET ORAL DAILY
COMMUNITY

## 2021-06-18 NOTE — PROGRESS NOTES
Jazzmine Bryant is a 79 y.o. male who presents with the following  Chief Complaint   Patient presents with    Follow-up    Results     results from doctor Deedee Velasquez testing. HPI        Fu neuropsych results. Met with neuropsychiatry last week and no questions new  Here to start medication   Here with sister- states moving him to assisted living has helped him significantly. He has been more active, engaged. More physically active also. He has no complaints today. No Known Allergies    Current Outpatient Medications   Medication Sig    acetaminophen (TYLENOL) 325 mg tablet Take  by mouth every four (4) hours as needed for Pain.  loperamide (Anti-Diarrheal, Loperamide,) 2 mg capsule Take  by mouth.  aspirin delayed-release 81 mg tablet Take  by mouth daily.  b complex-vitamin c-folic acid 0.8 mg (NEPHRO-LIZA) 0.8 mg tab tablet Take 1 Tablet by mouth daily.  magnesium hydroxide (Mr Banana Milk of Magnesia) 400 mg/5 mL suspension Take 30 mL by mouth daily as needed for Constipation.  omeprazole-sodium bicarbonate 40-1,680 mg pack Take  by mouth.  donepeziL (ARICEPT) 5 mg tablet Take 1 tablet by mouth daily X 1 month. Then increase to 10 mg thereafter.  donepeziL (ARICEPT) 10 mg tablet Take 1 Tablet by mouth nightly.  benazepriL (LOTENSIN) 10 mg tablet     atorvastatin (Lipitor) 10 mg tablet Take 1 Tab by mouth daily.  amLODIPine-benazepril (LOTREL) 5-10 mg per capsule Take 1 Cap by mouth daily.  indapamide (LOZOL) 1.25 mg tablet Take 1.25 mg by mouth daily.  sitaGLIPtin-metFORMIN (JANUMET)  mg per tablet Take 1 Tab by mouth daily.  OTHER Take 1 Tab by mouth daily. Indications: vit-b complex     No current facility-administered medications for this visit.        Social History     Tobacco Use   Smoking Status Never Smoker   Smokeless Tobacco Never Used       Past Medical History:   Diagnosis Date    Anxiety disorder     Constipation     Dementia ([address]  Diabetes ([facility] [address]     Hearing reduced     Hypertension     Other ill-defined conditions(799.89)     MRSA on left shoulder from insect bite    Vision decreased        Past Surgical History:   Procedure Laterality Date    COLONOSCOPY N/A 3/17/2017    COLONOSCOPY performed by Kevin Mendez MD at [address] [facility] HX CATARACT REMOVAL Bilateral     HX COLONOSCOPY      HX OTHER SURGICAL  1945    left eye        Family History   Problem Relation Age of Onset    Emphysema Mother     Heart Disease Mother     Cancer Father     Cancer Sister     Hypertension Sister        Social History     Socioeconomic History    Marital status: SINGLE     Spouse name: Not on file    Number of children: Not on file    Years of education: Not on file    Highest education level: Not on file   Tobacco Use    Smoking status: Never Smoker    Smokeless tobacco: Never Used   Substance and Sexual Activity    Alcohol use: No    Drug use: No     Social Determinants of Health     Financial Resource Strain:     Difficulty of Paying Living Expenses:    Food Insecurity:     Worried About Running Out of Food in the Last Year:     Ran Out of Food in the Last Year:    Transportation Needs:     Lack of Transportation (Medical):  Lack of Transportation (Non-Medical):    Physical Activity:     Days of Exercise per Week:     Minutes of Exercise per Session:    Stress:     Feeling of Stress :    Social Connections:     Frequency of Communication with Friends and Family:     Frequency of Social Gatherings with Friends and Family:     Attends Jain Services:     Active Member of Clubs or Organizations:     Attends Club or Organization Meetings:     Marital Status:        Review of Systems   Constitutional: Positive for malaise/fatigue. Eyes: Negative for blurred vision, double vision and photophobia. Neurological: Negative for dizziness and headaches. Psychiatric/Behavioral: Positive for memory loss.  Negative for depression, hallucinations, substance abuse and suicidal ideas. The patient is not nervous/anxious and does not have insomnia. Remainder of comprehensive review is negative. Physical Exam :    Visit Vitals  /64 (BP 1 Location: Left upper arm, BP Patient Position: Sitting)   Pulse 70   Resp 16   Ht 6' (1.829 m)   Wt 73 kg (161 lb)   SpO2 100%   BMI 21.84 kg/m²             Results for orders placed or performed during the hospital encounter of 03/17/17   CULTURE, MRSA    Specimen: Nares   Result Value Ref Range    Special Requests: NO SPECIAL REQUESTS      Culture result: MRSA NOT PRESENT      Culture result:            Screening of patient nares for MRSA is for surveillance purposes and, if positive, to facilitate isolation considerations in high risk settings. It is not intended for automatic decolonization interventions per se as regimens are not sufficiently effective to warrant routine use. GLUCOSE, POC   Result Value Ref Range    Glucose (POC) 143 (H) 65 - 100 mg/dL    Performed by Aniyah Galloway        Orders Placed This Encounter    acetaminophen (TYLENOL) 325 mg tablet     Sig: Take  by mouth every four (4) hours as needed for Pain.  loperamide (Anti-Diarrheal, Loperamide,) 2 mg capsule     Sig: Take  by mouth.  aspirin delayed-release 81 mg tablet     Sig: Take  by mouth daily.  b complex-vitamin c-folic acid 0.8 mg (NEPHRO-LIZA) 0.8 mg tab tablet     Sig: Take 1 Tablet by mouth daily.  magnesium hydroxide (Martinez Milk of Magnesia) 400 mg/5 mL suspension     Sig: Take 30 mL by mouth daily as needed for Constipation.  omeprazole-sodium bicarbonate 40-1,680 mg pack     Sig: Take  by mouth.  donepeziL (ARICEPT) 5 mg tablet     Sig: Take 1 tablet by mouth daily X 1 month. Then increase to 10 mg thereafter. Dispense:  30 Tablet     Refill:  0    donepeziL (ARICEPT) 10 mg tablet     Sig: Take 1 Tablet by mouth nightly.      Dispense:  30 Tablet     Refill:  0 1. Mixed Alzheimer's and vascular dementia ([facility] [address]          Discussed testing in full again   Start Aricept 10 mg for memory preservation   Starting at 5 mg and increase to 10   Print for assisted living center. Keeping active there which is good.    Add Namenda when comes back             This note will not be viewable in [employer]

## 2021-08-17 ENCOUNTER — OFFICE VISIT (OUTPATIENT)
Dept: NEUROLOGY | Age: 81
End: 2021-08-17
Payer: MEDICARE

## 2021-08-17 VITALS
WEIGHT: 161 LBS | HEART RATE: 71 BPM | OXYGEN SATURATION: 98 % | BODY MASS INDEX: 21.84 KG/M2 | RESPIRATION RATE: 18 BRPM | SYSTOLIC BLOOD PRESSURE: 128 MMHG | DIASTOLIC BLOOD PRESSURE: 60 MMHG

## 2021-08-17 DIAGNOSIS — G30.9 MIXED ALZHEIMER'S AND VASCULAR DEMENTIA (HCC): Primary | ICD-10-CM

## 2021-08-17 DIAGNOSIS — F01.50 MIXED ALZHEIMER'S AND VASCULAR DEMENTIA (HCC): Primary | ICD-10-CM

## 2021-08-17 DIAGNOSIS — F02.80 MIXED ALZHEIMER'S AND VASCULAR DEMENTIA (HCC): Primary | ICD-10-CM

## 2021-08-17 PROCEDURE — 99214 OFFICE O/P EST MOD 30 MIN: CPT | Performed by: PSYCHIATRY & NEUROLOGY

## 2021-08-17 PROCEDURE — G8427 DOCREV CUR MEDS BY ELIG CLIN: HCPCS | Performed by: PSYCHIATRY & NEUROLOGY

## 2021-08-17 PROCEDURE — G8536 NO DOC ELDER MAL SCRN: HCPCS | Performed by: PSYCHIATRY & NEUROLOGY

## 2021-08-17 PROCEDURE — 1101F PT FALLS ASSESS-DOCD LE1/YR: CPT | Performed by: PSYCHIATRY & NEUROLOGY

## 2021-08-17 PROCEDURE — G8510 SCR DEP NEG, NO PLAN REQD: HCPCS | Performed by: PSYCHIATRY & NEUROLOGY

## 2021-08-17 PROCEDURE — G8420 CALC BMI NORM PARAMETERS: HCPCS | Performed by: PSYCHIATRY & NEUROLOGY

## 2021-08-17 RX ORDER — MEMANTINE HYDROCHLORIDE 10 MG/1
10 TABLET ORAL 2 TIMES DAILY
Qty: 60 TABLET | Refills: 5 | Status: SHIPPED | OUTPATIENT
Start: 2021-08-17

## 2021-08-17 RX ORDER — MEMANTINE HYDROCHLORIDE 5 MG/1
TABLET ORAL
Qty: 42 TABLET | Refills: 0 | Status: SHIPPED | OUTPATIENT
Start: 2021-08-17 | End: 2022-07-01 | Stop reason: ALTCHOICE
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
## 2021-11-17 ENCOUNTER — OFFICE VISIT (OUTPATIENT)
Dept: NEUROLOGY | Age: 81
End: 2021-11-17
Payer: MEDICARE

## 2021-11-17 VITALS
OXYGEN SATURATION: 96 % | SYSTOLIC BLOOD PRESSURE: 116 MMHG | TEMPERATURE: 97.9 F | RESPIRATION RATE: 16 BRPM | HEART RATE: 65 BPM | HEIGHT: 72 IN | BODY MASS INDEX: 22.89 KG/M2 | WEIGHT: 169 LBS | DIASTOLIC BLOOD PRESSURE: 56 MMHG

## 2021-11-17 DIAGNOSIS — F01.50 MIXED ALZHEIMER'S AND VASCULAR DEMENTIA (HCC): Primary | ICD-10-CM

## 2021-11-17 DIAGNOSIS — G30.9 MIXED ALZHEIMER'S AND VASCULAR DEMENTIA (HCC): Primary | ICD-10-CM

## 2021-11-17 DIAGNOSIS — F02.80 MIXED ALZHEIMER'S AND VASCULAR DEMENTIA (HCC): Primary | ICD-10-CM

## 2021-11-17 PROCEDURE — G8510 SCR DEP NEG, NO PLAN REQD: HCPCS | Performed by: PSYCHIATRY & NEUROLOGY

## 2021-11-17 PROCEDURE — 99213 OFFICE O/P EST LOW 20 MIN: CPT | Performed by: PSYCHIATRY & NEUROLOGY

## 2021-11-17 PROCEDURE — 1101F PT FALLS ASSESS-DOCD LE1/YR: CPT | Performed by: PSYCHIATRY & NEUROLOGY

## 2021-11-17 PROCEDURE — G8427 DOCREV CUR MEDS BY ELIG CLIN: HCPCS | Performed by: PSYCHIATRY & NEUROLOGY

## 2021-11-17 PROCEDURE — G8420 CALC BMI NORM PARAMETERS: HCPCS | Performed by: PSYCHIATRY & NEUROLOGY

## 2021-11-17 PROCEDURE — G8536 NO DOC ELDER MAL SCRN: HCPCS | Performed by: PSYCHIATRY & NEUROLOGY

## 2021-11-17 NOTE — PROGRESS NOTES
[facility] Neurology Clinics and [address] at [facility] Neurology Clinics at [address], [address] [place] [address] [address] [facility], [address] [place] [address]   [phone]              Chief Complaint   Patient presents with    Dementia     Resident at [address] [place] [address] Neurologic Problem     Has PT at [facility] // gait concerns // but PT cleared him for walking without assistance     Current Outpatient Medications   Medication Sig Dispense Refill    memantine (Namenda) 5 mg tablet 1 po every day x 7 then 1 po bid x 7 the 1 qam and 2 qpm x 7 then start 10mg bid  Titration Rx DO NOT REFILL 42 Tablet 0    memantine (Namenda) 10 mg tablet Take 1 Tablet by mouth two (2) times a day. 60 Tablet 5    acetaminophen (TYLENOL) 325 mg tablet Take  by mouth every four (4) hours as needed for Pain.  loperamide (Anti-Diarrheal, Loperamide,) 2 mg capsule Take  by mouth.  aspirin delayed-release 81 mg tablet Take  by mouth daily.  b complex-vitamin c-folic acid 0.8 mg (NEPHRO-LIZA) 0.8 mg tab tablet Take 1 Tablet by mouth daily.  magnesium hydroxide (Martinez Milk of Magnesia) 400 mg/5 mL suspension Take 30 mL by mouth daily as needed for Constipation.  omeprazole-sodium bicarbonate 40-1,680 mg pack Take  by mouth.  donepeziL (ARICEPT) 10 mg tablet Take 1 Tablet by mouth nightly. 30 Tablet 0    benazepriL (LOTENSIN) 10 mg tablet       atorvastatin (Lipitor) 10 mg tablet Take 1 Tab by mouth daily.  indapamide (LOZOL) 1.25 mg tablet Take 1.25 mg by mouth daily.  sitaGLIPtin-metFORMIN (JANUMET)  mg per tablet Take 1 Tab by mouth daily.  amLODIPine-benazepril (LOTREL) 5-10 mg per capsule Take 1 Cap by mouth daily.           No Known Allergies  Social History     Tobacco Use    Smoking status: Never Smoker    Smokeless tobacco: Never Used   Substance Use Topics    Alcohol use: No    Drug use: No     13-year-old man returns today for follow-up dementia, mixed vascular and Alzheimer's type. Last visit with me was 17 August.  We started him on Namenda to add with Aricept. He has seemingly tolerated that well. He does not have his med list with him. Son is with him. He has stayed busy in the facility. He just worked a 500 piece puzzle. Eating and sleeping well. Examination  Visit Vitals  BP (!) 116/56 (BP 1 Location: Left upper arm, BP Patient Position: Sitting)   Pulse 65   Temp 97.9 °F (36.6 °C) (Temporal)   Resp 16   Ht 6' (1.829 m)   Wt 76.7 kg (169 lb)   SpO2 96%   BMI 22.92 kg/m²     He is awake alert very well-appearing. Engaging and interactive. Oriented to November 17, 2021 says it is a Wednesday. Knows the current president, the previous president and the president prior to that. Correctly calculates. Spells the word house forward and backwards. Knows were on the second floor. Knows the correct city and state. Impression/Plan  Mixed type dementia  Continue Aricept and Namenda  Follow-up in 6 months    Milagro Camara MD        This note was created using voice recognition software. Despite editing, there may be syntax errors.

## 2022-07-01 ENCOUNTER — OFFICE VISIT (OUTPATIENT)
Dept: NEUROLOGY | Age: 82
End: 2022-07-01
Payer: MEDICARE

## 2022-07-01 VITALS
OXYGEN SATURATION: 97 % | SYSTOLIC BLOOD PRESSURE: 130 MMHG | HEART RATE: 75 BPM | BODY MASS INDEX: 22.92 KG/M2 | HEIGHT: 72 IN | DIASTOLIC BLOOD PRESSURE: 70 MMHG

## 2022-07-01 DIAGNOSIS — F01.50 MIXED ALZHEIMER'S AND VASCULAR DEMENTIA (HCC): Primary | ICD-10-CM

## 2022-07-01 DIAGNOSIS — G30.9 MIXED ALZHEIMER'S AND VASCULAR DEMENTIA (HCC): Primary | ICD-10-CM

## 2022-07-01 DIAGNOSIS — F02.80 MIXED ALZHEIMER'S AND VASCULAR DEMENTIA (HCC): Primary | ICD-10-CM

## 2022-07-01 PROCEDURE — G8432 DEP SCR NOT DOC, RNG: HCPCS | Performed by: NURSE PRACTITIONER

## 2022-07-01 PROCEDURE — 1123F ACP DISCUSS/DSCN MKR DOCD: CPT | Performed by: NURSE PRACTITIONER

## 2022-07-01 PROCEDURE — 99214 OFFICE O/P EST MOD 30 MIN: CPT | Performed by: NURSE PRACTITIONER

## 2022-07-01 PROCEDURE — G8420 CALC BMI NORM PARAMETERS: HCPCS | Performed by: NURSE PRACTITIONER

## 2022-07-01 PROCEDURE — 1101F PT FALLS ASSESS-DOCD LE1/YR: CPT | Performed by: NURSE PRACTITIONER

## 2022-07-01 PROCEDURE — G8428 CUR MEDS NOT DOCUMENT: HCPCS | Performed by: NURSE PRACTITIONER

## 2022-07-01 PROCEDURE — G8536 NO DOC ELDER MAL SCRN: HCPCS | Performed by: NURSE PRACTITIONER

## 2022-07-01 NOTE — PROGRESS NOTES
Latasha Thomason is a [de-identified] y.o. male who presents with the following  Chief Complaint   Patient presents with    Dementia       HPI    FU dementia with sister. 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 He does like crosswords   He likes to be social   Has been getting to some [facility] in house recently   He has 1 X fallen   He has a life alert  He states the classes are cut right now due to COVID infections in house  He is eating well   Sleeping well  He feels good   On Aricept 10 mg   Namenda 10 mg BID     No Known Allergies    Current Outpatient Medications   Medication Sig    memantine (Namenda) 10 mg tablet Take 1 Tablet by mouth two (2) times a day.  aspirin delayed-release 81 mg tablet Take  by mouth daily.  b complex-vitamin c-folic acid 0.8 mg (NEPHRO-LIZA) 0.8 mg tab tablet Take 1 Tablet by mouth daily.  omeprazole-sodium bicarbonate 40-1,680 mg pack Take  by mouth.  donepeziL (ARICEPT) 10 mg tablet Take 1 Tablet by mouth nightly.  benazepriL (LOTENSIN) 10 mg tablet     atorvastatin (Lipitor) 10 mg tablet Take 1 Tab by mouth daily.  amLODIPine-benazepril (LOTREL) 5-10 mg per capsule Take 1 Cap by mouth daily.  acetaminophen (TYLENOL) 325 mg tablet Take  by mouth every four (4) hours as needed for Pain. (Patient not taking: Reported on 7/1/2022)    loperamide (Anti-Diarrheal, Loperamide,) 2 mg capsule Take  by mouth. (Patient not taking: Reported on 7/1/2022)    magnesium hydroxide (Martinez Milk of Magnesia) 400 mg/5 mL suspension Take 30 mL by mouth daily as needed for Constipation. (Patient not taking: Reported on 7/1/2022)    indapamide (LOZOL) 1.25 mg tablet Take 1.25 mg by mouth daily.    (Patient not taking: Reported on 7/1/2022)    sitaGLIPtin-metFORMIN (Vassie Heading)  mg per tablet Take 1 Tab by mouth daily. (Patient not taking: Reported on 7/1/2022)     No current facility-administered medications for this visit. Social History     Tobacco Use   Smoking Status Never Smoker   Smokeless Tobacco Never Used       Past Medical History:   Diagnosis Date    Anxiety disorder     Constipation     Dementia ([facility] [address]     Diabetes ([facility] [address] [facility]     Hearing reduced     Hypertension     Other ill-defined conditions(799.89)     MRSA on left shoulder from insect bite    Vision decreased        Past Surgical History:   Procedure Laterality Date    COLONOSCOPY N/A 3/17/2017    COLONOSCOPY performed by Juan A Rincon MD at [address] [place] [address] HX CATARACT REMOVAL Bilateral     HX COLONOSCOPY      HX OTHER SURGICAL  80    left eye        Family History   Problem Relation Age of Onset    Emphysema Mother     Heart Disease Mother     Cancer Father     Cancer Sister     Hypertension Sister        Social History     Socioeconomic History    Marital status: SINGLE   Tobacco Use    Smoking status: Never Smoker    Smokeless tobacco: Never Used   Substance and Sexual Activity    Alcohol use: No    Drug use: No       Review of Systems   Eyes: Negative for blurred vision, double vision and photophobia. Gastrointestinal: Negative for abdominal pain, nausea and vomiting. Neurological: Negative for dizziness, tingling and headaches. Psychiatric/Behavioral: Positive for memory loss. Remainder of comprehensive review is negative.      Physical Exam :    Visit Vitals  /70   Pulse 75   Ht 6' (1.829 m)   SpO2 97%   BMI 22.92 kg/m²       General: Well defined, nourished, and groomed individual in no acute distress.    Neck: Supple, nontender, no bruits, no pain with resistance to active range of motion.    Musculoskeletal: Extremities revealed no edema and had full range of motion of joints.    Psych: Good mood and bright affect    NEUROLOGICAL EXAMINATION:    Mental Status: Alert and oriented to person, place, and time. mmse 30     Cranial Nerves:    II, III, IV, VI: Visual acuity grossly intact. Visual fields are normal.    Pupils are equal, round, and reactive to light and accommodation.    Extra-ocular movements are full and fluid. Fundoscopic exam was benign, no ptosis or nystagmus.    V-XII: Hearing is grossly intact. Facial features are symmetric, with normal sensation and strength. The palate rises symmetrically and the tongue protrudes midline. Sternocleidomastoids 5/5. Motor Examination: Normal tone, bulk, and strength, 5/5 muscle strength throughout. Coordination: Finger to nose was normal. No resting or intention tremor    Gait and Station: Steady while walking. Normal arm swing. No pronator drift. No muscle wasting or fasiculations noted. Reflexes: DTRs 2+ throughout. Results for orders placed or performed during the hospital encounter of 03/17/17   CULTURE, MRSA    Specimen: Nares   Result Value Ref Range    Special Requests: NO SPECIAL REQUESTS      Culture result: MRSA NOT PRESENT      Culture result:            Screening of patient nares for MRSA is for surveillance purposes and, if positive, to facilitate isolation considerations in high risk settings. It is not intended for automatic decolonization interventions per se as regimens are not sufficiently effective to warrant routine use. GLUCOSE, POC   Result Value Ref Range    Glucose (POC) 143 (H) 65 - 100 mg/dL    Performed by Elder Fernandez        No orders of the defined types were placed in this encounter. 1. Mixed Alzheimer's and vascular dementia ([facility] [address]         Dementia is doing well   he is being well cared for at his assisted living   Keep Cherri Pradhan as is   He is staying physically and mentally active. Continue to track and call with any changes.            This note will not be viewable in [email or web address]

## 2023-01-05 ENCOUNTER — OFFICE VISIT (OUTPATIENT)
Dept: NEUROLOGY | Age: 83
End: 2023-01-05
Payer: MEDICARE

## 2023-01-05 VITALS
SYSTOLIC BLOOD PRESSURE: 118 MMHG | RESPIRATION RATE: 20 BRPM | HEART RATE: 74 BPM | OXYGEN SATURATION: 97 % | DIASTOLIC BLOOD PRESSURE: 76 MMHG

## 2023-01-05 DIAGNOSIS — F02.80 MIXED ALZHEIMER'S AND VASCULAR DEMENTIA (HCC): Primary | ICD-10-CM

## 2023-01-05 DIAGNOSIS — F01.50 MIXED ALZHEIMER'S AND VASCULAR DEMENTIA (HCC): Primary | ICD-10-CM

## 2023-01-05 DIAGNOSIS — G30.9 MIXED ALZHEIMER'S AND VASCULAR DEMENTIA (HCC): Primary | ICD-10-CM

## 2023-01-05 PROCEDURE — 1123F ACP DISCUSS/DSCN MKR DOCD: CPT | Performed by: PSYCHIATRY & NEUROLOGY

## 2023-01-05 PROCEDURE — G8420 CALC BMI NORM PARAMETERS: HCPCS | Performed by: PSYCHIATRY & NEUROLOGY

## 2023-01-05 PROCEDURE — 1101F PT FALLS ASSESS-DOCD LE1/YR: CPT | Performed by: PSYCHIATRY & NEUROLOGY

## 2023-01-05 PROCEDURE — G8432 DEP SCR NOT DOC, RNG: HCPCS | Performed by: PSYCHIATRY & NEUROLOGY

## 2023-01-05 PROCEDURE — G8536 NO DOC ELDER MAL SCRN: HCPCS | Performed by: PSYCHIATRY & NEUROLOGY

## 2023-01-05 PROCEDURE — 99214 OFFICE O/P EST MOD 30 MIN: CPT | Performed by: PSYCHIATRY & NEUROLOGY

## 2023-01-05 PROCEDURE — G8428 CUR MEDS NOT DOCUMENT: HCPCS | Performed by: PSYCHIATRY & NEUROLOGY

## 2023-01-05 RX ORDER — MEMANTINE HYDROCHLORIDE 10 MG/1
10 TABLET ORAL 2 TIMES DAILY
Qty: 60 TABLET | Refills: 5 | Status: SHIPPED | OUTPATIENT
Start: 2023-01-05

## 2023-01-05 RX ORDER — DONEPEZIL HYDROCHLORIDE 10 MG/1
10 TABLET, FILM COATED ORAL
Qty: 30 TABLET | Refills: 5 | Status: SHIPPED | OUTPATIENT
Start: 2023-01-05

## 2023-01-05 NOTE — PROGRESS NOTES
Has been doing quite well   Has been there for about 2 years   [facility] in [place]

## 2023-01-05 NOTE — PROGRESS NOTES
[facility] Neurology [facility] and [address] at [facility] Neurology Clinics at [address] [facility], [address] [place] [address] [address] [facility], [address] [place] [address]   [phone]              Chief Complaint   Patient presents with    Follow-up     Dementia      Current Outpatient Medications   Medication Sig Dispense Refill    memantine (Namenda) 10 mg tablet Take 1 Tablet by mouth two (2) times a day. 60 Tablet 5    aspirin delayed-release 81 mg tablet Take  by mouth daily. b complex-vitamin c-folic acid 0.8 mg (NEPHRO-LIZA) 0.8 mg tab tablet Take 1 Tablet by mouth daily. omeprazole-sodium bicarbonate 40-1,680 mg pack Take  by mouth. donepeziL (ARICEPT) 10 mg tablet Take 1 Tablet by mouth nightly. 30 Tablet 0    benazepriL (LOTENSIN) 10 mg tablet       atorvastatin (LIPITOR) 10 mg tablet Take 1 Tab by mouth daily. amLODIPine-benazepril (LOTREL) 5-10 mg per capsule Take 1 Cap by mouth daily. acetaminophen (TYLENOL) 325 mg tablet Take  by mouth every four (4) hours as needed for Pain. (Patient not taking: No sig reported)      loperamide (IMODIUM) 2 mg capsule Take  by mouth. (Patient not taking: No sig reported)      magnesium hydroxide (MILK OF MAGNESIA) 400 mg/5 mL suspension Take 30 mL by mouth daily as needed for Constipation. (Patient not taking: No sig reported)      indapamide (LOZOL) 1.25 mg tablet Take 1.25 mg by mouth daily. (Patient not taking: No sig reported)      SITagliptin-metFORMIN (JANUMET)  mg per tablet Take 1 Tab by mouth daily. (Patient not taking: No sig reported)        No Known Allergies  Social History     Tobacco Use    Smoking status: Never    Smokeless tobacco: Never   Substance Use Topics    Alcohol use: No    Drug use: No     55-year-old gentleman returns today with family for follow-up dementia Alzheimer's mixed vascular type. He has been on Aricept and Namenda.   He continues in assisted living. He took the memory improvement course and physical therapy. He does puzzles and reads during the day. He tries to stay active. Compliant with medicine. Thinks his memory is doing well. Family concurs. Most recent laboratory analysis from May of this year  Metabolic panel with potassium 3.4 and glucose 156  Hemoglobin 10.6  Hematocrit 32.4    Examination  Visit Vitals  /76 (BP 1 Location: Left upper arm, BP Patient Position: Sitting, BP Cuff Size: Adult)   Pulse 74   Resp 20   SpO2 97%   He looks well. He is awake alert oriented to the correct day date and year. He knows the current and previous president. He knows the floor city and state. He spells the word house forward and backward. No motor focality. No ataxia      Impression/Plan  Mixed Alzheimer's and vascular type dementia stable  Continue with Aricept and Namenda  Follow-up in 6 months    Suresh Graham MD        This note was created using voice recognition software. Despite editing, there may be syntax errors.

## 2023-07-06 ENCOUNTER — OFFICE VISIT (OUTPATIENT)
Age: 83
End: 2023-07-06
Payer: MEDICARE

## 2023-07-06 VITALS
BODY MASS INDEX: 22.92 KG/M2 | DIASTOLIC BLOOD PRESSURE: 55 MMHG | RESPIRATION RATE: 18 BRPM | WEIGHT: 169 LBS | HEART RATE: 78 BPM | SYSTOLIC BLOOD PRESSURE: 111 MMHG | OXYGEN SATURATION: 96 %

## 2023-07-06 DIAGNOSIS — F01.50 VASCULAR DEMENTIA WITHOUT BEHAVIORAL DISTURBANCE (HCC): Primary | ICD-10-CM

## 2023-07-06 DIAGNOSIS — R26.9 GAIT ABNORMALITY: ICD-10-CM

## 2023-07-06 PROCEDURE — G8427 DOCREV CUR MEDS BY ELIG CLIN: HCPCS | Performed by: PSYCHIATRY & NEUROLOGY

## 2023-07-06 PROCEDURE — 1123F ACP DISCUSS/DSCN MKR DOCD: CPT | Performed by: PSYCHIATRY & NEUROLOGY

## 2023-07-06 PROCEDURE — 99214 OFFICE O/P EST MOD 30 MIN: CPT | Performed by: PSYCHIATRY & NEUROLOGY

## 2023-07-06 PROCEDURE — 4004F PT TOBACCO SCREEN RCVD TLK: CPT | Performed by: PSYCHIATRY & NEUROLOGY

## 2023-07-06 PROCEDURE — G8420 CALC BMI NORM PARAMETERS: HCPCS | Performed by: PSYCHIATRY & NEUROLOGY

## 2023-07-06 RX ORDER — MEMANTINE HYDROCHLORIDE 10 MG/1
10 TABLET ORAL 2 TIMES DAILY
Qty: 60 TABLET | Refills: 11 | Status: SHIPPED | OUTPATIENT
Start: 2023-07-06

## 2023-07-06 RX ORDER — DONEPEZIL HYDROCHLORIDE 10 MG/1
10 TABLET, FILM COATED ORAL NIGHTLY
Qty: 30 TABLET | Refills: 11 | Status: SHIPPED | OUTPATIENT
Start: 2023-07-06

## 2023-07-06 ASSESSMENT — PATIENT HEALTH QUESTIONNAIRE - PHQ9
SUM OF ALL RESPONSES TO PHQ QUESTIONS 1-9: 0
SUM OF ALL RESPONSES TO PHQ9 QUESTIONS 1 & 2: 0
2. FEELING DOWN, DEPRESSED OR HOPELESS: 0
1. LITTLE INTEREST OR PLEASURE IN DOING THINGS: 0

## 2023-07-06 NOTE — PROGRESS NOTES
[facility] Neurology Clinics and [address] at [facility] Neurology Clinics at [address] [place] [address], [address] [place] [address]   [phone]              No chief complaint on file. Current Outpatient Medications   Medication Sig Dispense Refill    acetaminophen (TYLENOL) 325 MG tablet Take by mouth every 4 hours as needed      amLODIPine-benazepril (LOTREL) 5-10 MG per capsule Take 1 capsule by mouth daily      aspirin 81 MG EC tablet Take by mouth daily      atorvastatin (LIPITOR) 10 MG tablet Take 1 tablet by mouth daily      benazepril (LOTENSIN) 10 MG tablet ceived the following from [facility]: Outside name: benazepriL (LOTENSIN) 10 mg tablet      donepezil (ARICEPT) 10 MG tablet Take 1 tablet by mouth nightly      indapamide (LOZOL) 1.25 MG tablet Take 1.25 mg by mouth daily      loperamide (IMODIUM) 2 MG capsule Take by mouth      magnesium hydroxide (MILK OF MAGNESIA) 400 MG/5ML suspension Take 30 mLs by mouth daily as needed      memantine (NAMENDA) 10 MG tablet Take 10 mg by mouth 2 times daily      omeprazole-sodium bicarbonate (ZEGERID)  MG PACK Take by mouth      sitaGLIPtan-metFORMIN (JANUMET)  MG per tablet Take 1 tablet by mouth daily       No current facility-administered medications for this visit. No Known Allergies  Social History     Tobacco Use    Smoking status: Never    Smokeless tobacco: Never   Substance Use Topics    Alcohol use: No    Drug use: No     81-year-old gentleman returns today for follow-up dementia Alzheimer/vascular mixed type. His last visit with me was in January and he was maintained on Aricept and Namenda. Today the patient presents with his sister and both provide history. He thinks his memory is perhaps a little worse particularly when it comes to remembering people's names.   He knows who they are but sometimes he just

## 2023-08-29 ENCOUNTER — TELEPHONE (OUTPATIENT)
Age: 83
End: 2023-08-29

## 2023-08-29 NOTE — TELEPHONE ENCOUNTER
[address] [facility] calling to discuss   PT & meds    Pls call Sherry Pollock   [phone]

## 2023-10-19 ENCOUNTER — TELEPHONE (OUTPATIENT)
Age: 83
End: 2023-10-19

## 2023-10-23 NOTE — TELEPHONE ENCOUNTER
S/W Wynelle Lennox at [facility]. They needed LOV note sent for billing.   This has been sent

## 2023-10-25 ENCOUNTER — TELEPHONE (OUTPATIENT)
Age: 83
End: 2023-10-25

## 2024-01-10 ENCOUNTER — OFFICE VISIT (OUTPATIENT)
Age: 84
End: 2024-01-10
Payer: MEDICARE

## 2024-01-10 VITALS
SYSTOLIC BLOOD PRESSURE: 132 MMHG | DIASTOLIC BLOOD PRESSURE: 62 MMHG | OXYGEN SATURATION: 97 % | HEART RATE: 70 BPM | RESPIRATION RATE: 16 BRPM

## 2024-01-10 DIAGNOSIS — G30.9 MIXED DEMENTIA (HCC): Primary | ICD-10-CM

## 2024-01-10 DIAGNOSIS — F01.50 MIXED DEMENTIA (HCC): Primary | ICD-10-CM

## 2024-01-10 DIAGNOSIS — F02.80 MIXED DEMENTIA (HCC): Primary | ICD-10-CM

## 2024-01-10 PROCEDURE — G8484 FLU IMMUNIZE NO ADMIN: HCPCS | Performed by: PSYCHIATRY & NEUROLOGY

## 2024-01-10 PROCEDURE — 99214 OFFICE O/P EST MOD 30 MIN: CPT | Performed by: PSYCHIATRY & NEUROLOGY

## 2024-01-10 PROCEDURE — G8420 CALC BMI NORM PARAMETERS: HCPCS | Performed by: PSYCHIATRY & NEUROLOGY

## 2024-01-10 PROCEDURE — 96138 PSYCL/NRPSYC TECH 1ST: CPT | Performed by: PSYCHIATRY & NEUROLOGY

## 2024-01-10 PROCEDURE — G8428 CUR MEDS NOT DOCUMENT: HCPCS | Performed by: PSYCHIATRY & NEUROLOGY

## 2024-01-10 PROCEDURE — 4004F PT TOBACCO SCREEN RCVD TLK: CPT | Performed by: PSYCHIATRY & NEUROLOGY

## 2024-01-10 PROCEDURE — 96132 NRPSYC TST EVAL PHYS/QHP 1ST: CPT | Performed by: PSYCHIATRY & NEUROLOGY

## 2024-01-10 PROCEDURE — 1123F ACP DISCUSS/DSCN MKR DOCD: CPT | Performed by: PSYCHIATRY & NEUROLOGY

## 2024-01-10 RX ORDER — DONEPEZIL HYDROCHLORIDE 10 MG/1
10 TABLET, FILM COATED ORAL NIGHTLY
Qty: 90 TABLET | Refills: 3 | Status: SHIPPED | OUTPATIENT
Start: 2024-01-10

## 2024-01-10 RX ORDER — MEMANTINE HYDROCHLORIDE 10 MG/1
10 TABLET ORAL 2 TIMES DAILY
Qty: 180 TABLET | Refills: 3 | Status: SHIPPED | OUTPATIENT
Start: 2024-01-10

## 2024-01-10 ASSESSMENT — PATIENT HEALTH QUESTIONNAIRE - PHQ9
SUM OF ALL RESPONSES TO PHQ QUESTIONS 1-9: 0
1. LITTLE INTEREST OR PLEASURE IN DOING THINGS: 0
SUM OF ALL RESPONSES TO PHQ9 QUESTIONS 1 & 2: 0
2. FEELING DOWN, DEPRESSED OR HOPELESS: 0
SUM OF ALL RESPONSES TO PHQ QUESTIONS 1-9: 0

## 2024-01-10 NOTE — PROGRESS NOTES
18820 (16 minute minimum)  __20s minutes were spent administering cognitive testing by medical assistant/nurse.   Cognitive Testing Evaluation     Introduction:     Anisha Benitez  1940  Female   This 83 year old Female was administered a battery of neurocognitive testing on 01/10/2024.     Tests Administered:     Trails A, Trails B, Digit Symbol Substitution, Stroop, Immediate Recognition, Delayed Recognition   The combined test administration time was 20 minutes   Test Results:   Cognitive testing was provided via a battery of cognitive assessments. The pattern of test scores indicate that results are valid.  A Clinical Report with further description of scores and results is also available.   Overall: Patient tested in the 1st* percentile (standard score of 45*).   Trails A: Patient tested in the 1st percentile (scaled standard score of 60).  Trails B: Patient tested in the --* percentile (scaled standard score of null).  Digit Symbol Substitution: Patient tested in the 1st percentile (scaled standard score of 56).  Stroop: Patient tested in the 1st percentile (scaled standard score of 51).  Immediate Recognition: Patient tested in the 1st percentile (scaled standard score of 66).  Delayed Recognition: Patient tested in the 1st percentile (scaled standard score of 67).   * These assessments were not scored because they were potentially invalid, or the patient failed to complete in the allotted time.   Interpretation of Test Scores:     Examination of individual component tests shows:   Attention - Trails A: likely impairment  Mental Flexibility - Trails B: possible impairment  Executive Function - Digit Symbol Substitution: likely impairment  Executive Function - Stroop: likely impairment  Memory - Immediate Recognition: likely impairment  Memory - Delayed Recognition: likely impairment    The patient’s overall cognitive test performance was in the 1st percentile when compared to individuals of a similar 
visit in July we continued Aricept and Namenda.  Today he is here with his sister.  Both provide history.  He also has some difficulty with gait and balance.  He notes that he stays busy doing jigsaw puzzles and word puzzles and he plays Scrabble.  Tolerates medicine without difficulty.  Sister has noted that his memory has declined a bit.  He notes that he is having more difficulty recalling people's names and that is frustrating for him.  He tries to associate their names with things that he can remember    Emergency department for as it December 11 down in [place] where he came in with respiratory symptoms question COVID.  His COVID test was positive.  He was given Paxlovid.    Laboratory analysis December 11, 2023  Comprehensive metabolic profile with elevated glucose otherwise unremarkable  Lactic acid normal  CBC with hemoglobin of 11 otherwise unremarkable  COVID-19 positive  Influenza A negative  Influenza B negative    CT scan of the head done at [facility] October 27, 2023 for indication of ataxia was unremarkable with age-related changes.    Examination  /62 (Site: Right Upper Arm, Position: Sitting, Cuff Size: Medium Adult)   Pulse 70   Resp 16   SpO2 97%   He is well-appearing.  He is awake and alert to the correct day date and year.  He knows we are in [facility] on the second floor.  He spells the word house forward and backward.  He knows the current president is Naima.      Cognitive Testing Evaluation   Introduction:      Anisha Benitez  1940  Female   This 83 year old Female was administered a battery of neurocognitive testing on 01/10/2024.      Tests Administered:      Trails A, Trails B, Digit Symbol Substitution, Stroop, Immediate Recognition, Delayed Recognition   The combined test administration time was 20 minutes   Test Results:   Cognitive testing was provided via a battery of cognitive assessments. The pattern of test scores indicate that results are

## 2024-07-09 RX ORDER — CARBIDOPA/LEVODOPA 25MG-250MG
0.5 TABLET ORAL 3 TIMES DAILY
COMMUNITY
End: 2024-07-10 | Stop reason: SDUPTHER

## 2024-07-10 ENCOUNTER — OFFICE VISIT (OUTPATIENT)
Age: 84
End: 2024-07-10
Payer: MEDICARE

## 2024-07-10 VITALS
OXYGEN SATURATION: 99 % | SYSTOLIC BLOOD PRESSURE: 119 MMHG | HEART RATE: 79 BPM | DIASTOLIC BLOOD PRESSURE: 56 MMHG | RESPIRATION RATE: 16 BRPM

## 2024-07-10 DIAGNOSIS — G20.C PARKINSONISM, UNSPECIFIED PARKINSONISM TYPE (HCC): ICD-10-CM

## 2024-07-10 DIAGNOSIS — G81.94 LEFT HEMIPARESIS (HCC): ICD-10-CM

## 2024-07-10 DIAGNOSIS — G30.9 ALZHEIMER'S DISEASE, UNSPECIFIED (CODE) (HCC): Primary | ICD-10-CM

## 2024-07-10 DIAGNOSIS — F01.50 VASCULAR DEMENTIA WITHOUT BEHAVIORAL DISTURBANCE (HCC): ICD-10-CM

## 2024-07-10 DIAGNOSIS — Z86.73 HISTORY OF STROKE: ICD-10-CM

## 2024-07-10 PROCEDURE — 96132 NRPSYC TST EVAL PHYS/QHP 1ST: CPT | Performed by: PSYCHIATRY & NEUROLOGY

## 2024-07-10 PROCEDURE — 96138 PSYCL/NRPSYC TECH 1ST: CPT | Performed by: PSYCHIATRY & NEUROLOGY

## 2024-07-10 PROCEDURE — 1036F TOBACCO NON-USER: CPT | Performed by: PSYCHIATRY & NEUROLOGY

## 2024-07-10 PROCEDURE — G8421 BMI NOT CALCULATED: HCPCS | Performed by: PSYCHIATRY & NEUROLOGY

## 2024-07-10 PROCEDURE — 1123F ACP DISCUSS/DSCN MKR DOCD: CPT | Performed by: PSYCHIATRY & NEUROLOGY

## 2024-07-10 PROCEDURE — G8428 CUR MEDS NOT DOCUMENT: HCPCS | Performed by: PSYCHIATRY & NEUROLOGY

## 2024-07-10 PROCEDURE — 99214 OFFICE O/P EST MOD 30 MIN: CPT | Performed by: PSYCHIATRY & NEUROLOGY

## 2024-07-10 RX ORDER — AMLODIPINE BESYLATE 10 MG/1
10 TABLET ORAL DAILY
COMMUNITY

## 2024-07-10 RX ORDER — CARBIDOPA/LEVODOPA 25MG-250MG
TABLET ORAL
Qty: 90 TABLET | Refills: 5 | Status: SHIPPED | OUTPATIENT
Start: 2024-07-10

## 2024-07-10 RX ORDER — METFORMIN HYDROCHLORIDE 500 MG/1
1000 TABLET, EXTENDED RELEASE ORAL
COMMUNITY

## 2024-07-10 RX ORDER — DONEPEZIL HYDROCHLORIDE 10 MG/1
10 TABLET, FILM COATED ORAL NIGHTLY
Qty: 30 TABLET | Refills: 11 | Status: SHIPPED | OUTPATIENT
Start: 2024-07-10

## 2024-07-10 RX ORDER — MEMANTINE HYDROCHLORIDE 10 MG/1
10 TABLET ORAL 2 TIMES DAILY
Qty: 60 TABLET | Refills: 11 | Status: SHIPPED | OUTPATIENT
Start: 2024-07-10

## 2024-07-10 NOTE — PROGRESS NOTES
82626 (16 minute minimum)  20 minutes were spent administering cognitive testing by nurse.    Cognitive Testing Evaluation     Introduction:     Anisha Benitez  1940  Female   This 83 year old Female was administered a battery of neurocognitive testing on 07/10/2024.     Tests Administered:     Trails A, Trails B, Digit Symbol Substitution, Stroop, Immediate Recognition, Delayed Recognition   The combined test administration time was 20 minutes   Test Results:   Cognitive testing was provided via a battery of cognitive assessments. The pattern of test scores indicate that results are invalid.  A Clinical Report with further description of scores and results is also available.   Overall: Patient tested in the 1st* percentile (standard score of 24*).   Trails A: Patient tested in the 1st percentile (scaled standard score of 58).  Trails B: Patient tested in the --* percentile (scaled standard score of null).  Digit Symbol Substitution: Patient tested in the 1st* percentile (scaled standard score of -438).  Stroop: Patient tested in the 1st percentile (scaled standard score of -16).  Immediate Recognition: Patient tested in the 1st percentile (scaled standard score of 57).  Delayed Recognition: Patient tested in the 1st percentile (scaled standard score of 37).   * These assessments were not scored because they were potentially invalid, or the patient failed to complete in the allotted time.   Interpretation of Test Scores:     Examination of individual component tests shows:   Attention - Trails A: likely impairment  Mental Flexibility - Trails B: possible impairment  Executive Function - Digit Symbol Substitution: likely impairment  Executive Function - Stroop: likely impairment  Memory - Immediate Recognition: likely impairment  Memory - Delayed Recognition: likely impairment    The patient's overall cognitive test performance could not be assessed due to one or more assessments being either invalid or not

## 2024-07-10 NOTE — PROGRESS NOTES
[facility] Neurology Clinics and [facility] at [facility] Neurology Clinics at [address] [place] [address] [place] [address] [address] [facility] [address] [place] [address] [phone]              Chief Complaint   Patient presents with    Dementia    Cerebrovascular Accident     Resent CVA on 5/11/24, notes in care everywhere.  Was at  for in patient rehab, was started on sinemet for ?PD.  This has helped hand tremor significantly     Current Outpatient Medications   Medication Sig Dispense Refill    amLODIPine (NORVASC) 10 MG tablet Take 1 tablet by mouth daily      metFORMIN (GLUCOPHAGE-XR) 500 MG extended release tablet Take 2 tablets by mouth daily (with breakfast)      empagliflozin (JARDIANCE) 10 MG tablet Take 1 tablet by mouth daily      carbidopa-levodopa (SINEMET)  MG per tablet Take 0.5 tablets by mouth 3 times daily At 9 am, 1pm, and 6pm      donepezil (ARICEPT) 10 MG tablet Take 1 tablet by mouth nightly 90 tablet 3    memantine (NAMENDA) 10 MG tablet Take 1 tablet by mouth 2 times daily 180 tablet 3    acetaminophen (TYLENOL) 325 MG tablet Take by mouth every 4 hours as needed      aspirin 81 MG EC tablet Take by mouth daily      atorvastatin (LIPITOR) 40 MG tablet Take 1 tablet by mouth daily      benazepril (LOTENSIN) 10 MG tablet Take 2 tablets by mouth daily ceived the following from [facility]: Outside name: benazepriL (LOTENSIN) 10 mg tablet      loperamide (IMODIUM) 2 MG capsule Take by mouth      magnesium hydroxide (MILK OF MAGNESIA) 400 MG/5ML suspension Take 30 mLs by mouth daily as needed      omeprazole-sodium bicarbonate (ZEGERID)  MG PACK Take by mouth       No current facility-administered medications for this visit.      No Known Allergies  Social History     Tobacco Use    Smoking status: Never    Smokeless tobacco: Never   Substance Use Topics    Alcohol use: No    Drug use: No

## 2025-01-07 ENCOUNTER — TELEPHONE (OUTPATIENT)
Age: 85
End: 2025-01-07

## 2025-01-07 NOTE — TELEPHONE ENCOUNTER
I reached out to the patient's sister to confirm his appointment on 1/8/2025 but she informed me that he passed away on 8/7/2024. She wanted to let Dr. Chahal know that he is special and she appreciates how he worked with her brother. 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

## (undated) DEVICE — Device

## (undated) DEVICE — SOLIDIFIER MEDC 1200ML -- CONVERT TO 356117

## (undated) DEVICE — SIMPLICITY FLUFF UNDERPAD 23X36, MODERATE: Brand: SIMPLICITY

## (undated) DEVICE — CATH IV AUTOGRD BC PNK 20GA 25 -- INSYTE

## (undated) DEVICE — KENDALL RADIOLUCENT FOAM MONITORING ELECTRODE -RECTANGULAR SHAPE: Brand: KENDALL

## (undated) DEVICE — CANN NASAL O2 CAPNOGRAPHY AD -- FILTERLINE

## (undated) DEVICE — 3M™ CUROS™ DISINFECTING CAP FOR NEEDLELESS CONNECTORS 270/CARTON 20 CARTONS/CASE CFF1-270: Brand: CUROS™

## (undated) DEVICE — TUBING ADMIN SET INTRAV ARTERI -- CONVERT TO ITEM 340436

## (undated) DEVICE — 1200 GUARD II KIT W/5MM TUBE W/O VAC TUBE: Brand: GUARDIAN

## (undated) DEVICE — KIT COLON W/ 1.1OZ LUB AND 2 END

## (undated) DEVICE — ADULT SPO2 SENSOR: Brand: NELLCOR

## (undated) DEVICE — BAG BELONG PT PERS CLEAR HANDL

## (undated) DEVICE — CATH IV AUTOGRD BC BLU 22GA 25 -- INSYTE